# Patient Record
Sex: FEMALE | Race: WHITE | NOT HISPANIC OR LATINO | Employment: FULL TIME | URBAN - METROPOLITAN AREA
[De-identification: names, ages, dates, MRNs, and addresses within clinical notes are randomized per-mention and may not be internally consistent; named-entity substitution may affect disease eponyms.]

---

## 2021-02-27 ENCOUNTER — IMMUNIZATION (OUTPATIENT)
Dept: PHARMACY | Facility: CLINIC | Age: 31
End: 2021-02-27
Payer: COMMERCIAL

## 2021-02-27 DIAGNOSIS — Z23 NEED FOR VACCINATION: Primary | ICD-10-CM

## 2021-03-27 ENCOUNTER — IMMUNIZATION (OUTPATIENT)
Dept: PHARMACY | Facility: CLINIC | Age: 31
End: 2021-03-27
Payer: COMMERCIAL

## 2021-03-27 DIAGNOSIS — Z23 NEED FOR VACCINATION: Primary | ICD-10-CM

## 2021-07-20 ENCOUNTER — HOSPITAL ENCOUNTER (OUTPATIENT)
Dept: RADIOLOGY | Facility: HOSPITAL | Age: 31
Discharge: HOME | End: 2021-07-20
Attending: NURSE PRACTITIONER
Payer: COMMERCIAL

## 2021-07-20 ENCOUNTER — OFFICE VISIT (OUTPATIENT)
Dept: INTERNAL MEDICINE | Facility: CLINIC | Age: 31
End: 2021-07-20
Payer: COMMERCIAL

## 2021-07-20 VITALS
DIASTOLIC BLOOD PRESSURE: 78 MMHG | SYSTOLIC BLOOD PRESSURE: 110 MMHG | OXYGEN SATURATION: 98 % | BODY MASS INDEX: 35.01 KG/M2 | HEART RATE: 89 BPM | HEIGHT: 62 IN | WEIGHT: 190.25 LBS

## 2021-07-20 DIAGNOSIS — N91.2 AMENORRHEA: Primary | ICD-10-CM

## 2021-07-20 DIAGNOSIS — M54.9 DORSALGIA, UNSPECIFIED: ICD-10-CM

## 2021-07-20 DIAGNOSIS — M79.605 PAIN OF LEFT LOWER EXTREMITY: ICD-10-CM

## 2021-07-20 DIAGNOSIS — M54.30 SCIATICA, UNSPECIFIED LATERALITY: ICD-10-CM

## 2021-07-20 DIAGNOSIS — Z00.00 PREVENTATIVE HEALTH CARE: ICD-10-CM

## 2021-07-20 DIAGNOSIS — Z00.00 PREVENTATIVE HEALTH CARE: Primary | ICD-10-CM

## 2021-07-20 PROCEDURE — 3008F BODY MASS INDEX DOCD: CPT | Mod: CPTII,S$GLB,, | Performed by: NURSE PRACTITIONER

## 2021-07-20 PROCEDURE — 99385 PREV VISIT NEW AGE 18-39: CPT | Mod: 25,S$GLB,, | Performed by: NURSE PRACTITIONER

## 2021-07-20 PROCEDURE — 96372 PR INJECTION,THERAP/PROPH/DIAG2ST, IM OR SUBCUT: ICD-10-PCS | Mod: S$GLB,,, | Performed by: NURSE PRACTITIONER

## 2021-07-20 PROCEDURE — 99999 PR PBB SHADOW E&M-EST. PATIENT-LVL III: ICD-10-PCS | Mod: PBBFAC,,, | Performed by: NURSE PRACTITIONER

## 2021-07-20 PROCEDURE — 99999 PR PBB SHADOW E&M-EST. PATIENT-LVL III: CPT | Mod: PBBFAC,,, | Performed by: NURSE PRACTITIONER

## 2021-07-20 PROCEDURE — 3008F PR BODY MASS INDEX (BMI) DOCUMENTED: ICD-10-PCS | Mod: CPTII,S$GLB,, | Performed by: NURSE PRACTITIONER

## 2021-07-20 PROCEDURE — 99385 PR PREVENTIVE VISIT,NEW,18-39: ICD-10-PCS | Mod: 25,S$GLB,, | Performed by: NURSE PRACTITIONER

## 2021-07-20 PROCEDURE — 96372 THER/PROPH/DIAG INJ SC/IM: CPT | Mod: S$GLB,,, | Performed by: NURSE PRACTITIONER

## 2021-07-20 RX ORDER — IBUPROFEN 400 MG/1
800 TABLET ORAL EVERY 8 HOURS PRN
Qty: 60 TABLET | Refills: 1 | Status: SHIPPED | OUTPATIENT
Start: 2021-07-20 | End: 2021-10-11

## 2021-07-20 RX ORDER — KETOROLAC TROMETHAMINE 30 MG/ML
30 INJECTION, SOLUTION INTRAMUSCULAR; INTRAVENOUS
Status: COMPLETED | OUTPATIENT
Start: 2021-07-20 | End: 2021-07-20

## 2021-07-20 RX ADMIN — KETOROLAC TROMETHAMINE 30 MG: 30 INJECTION, SOLUTION INTRAMUSCULAR; INTRAVENOUS at 09:07

## 2021-07-30 ENCOUNTER — PATIENT MESSAGE (OUTPATIENT)
Dept: INTERNAL MEDICINE | Facility: CLINIC | Age: 31
End: 2021-07-30

## 2021-08-05 ENCOUNTER — HOSPITAL ENCOUNTER (OUTPATIENT)
Dept: RADIOLOGY | Facility: HOSPITAL | Age: 31
Discharge: HOME | End: 2021-08-05
Attending: NURSE PRACTITIONER
Payer: COMMERCIAL

## 2021-08-05 PROCEDURE — 72202 XR SACROILIAC JOINTS COMPLETE: ICD-10-PCS | Mod: 26,,, | Performed by: RADIOLOGY

## 2021-08-05 PROCEDURE — 72202 X-RAY EXAM SI JOINTS 3/> VWS: CPT | Mod: TC,PO

## 2021-08-05 PROCEDURE — 72110 XR LUMBAR SPINE COMPLETE 5 VIEW: ICD-10-PCS | Mod: 26,,, | Performed by: RADIOLOGY

## 2021-08-05 PROCEDURE — 72110 X-RAY EXAM L-2 SPINE 4/>VWS: CPT | Mod: TC,PO

## 2021-08-05 PROCEDURE — 72110 X-RAY EXAM L-2 SPINE 4/>VWS: CPT | Mod: 26,,, | Performed by: RADIOLOGY

## 2021-08-05 PROCEDURE — 72202 X-RAY EXAM SI JOINTS 3/> VWS: CPT | Mod: 26,,, | Performed by: RADIOLOGY

## 2021-08-08 ENCOUNTER — PATIENT MESSAGE (OUTPATIENT)
Dept: INTERNAL MEDICINE | Facility: CLINIC | Age: 31
End: 2021-08-08

## 2021-08-22 ENCOUNTER — PATIENT MESSAGE (OUTPATIENT)
Dept: INTERNAL MEDICINE | Facility: CLINIC | Age: 31
End: 2021-08-22

## 2021-08-22 DIAGNOSIS — M54.30 SCIATICA, UNSPECIFIED LATERALITY: ICD-10-CM

## 2021-08-22 DIAGNOSIS — M54.9 DORSALGIA, UNSPECIFIED: Primary | ICD-10-CM

## 2021-08-22 DIAGNOSIS — M79.605 PAIN OF LEFT LOWER EXTREMITY: ICD-10-CM

## 2021-08-23 ENCOUNTER — TELEPHONE (OUTPATIENT)
Dept: PAIN MEDICINE | Facility: CLINIC | Age: 31
End: 2021-08-23

## 2021-08-23 ENCOUNTER — PATIENT MESSAGE (OUTPATIENT)
Dept: INTERNAL MEDICINE | Facility: CLINIC | Age: 31
End: 2021-08-23

## 2021-08-26 ENCOUNTER — PATIENT MESSAGE (OUTPATIENT)
Dept: ORTHOPEDICS | Facility: CLINIC | Age: 31
End: 2021-08-26

## 2021-09-10 ENCOUNTER — TELEPHONE (OUTPATIENT)
Dept: PAIN MEDICINE | Facility: CLINIC | Age: 31
End: 2021-09-10

## 2021-09-22 ENCOUNTER — PATIENT MESSAGE (OUTPATIENT)
Dept: ORTHOPEDICS | Facility: CLINIC | Age: 31
End: 2021-09-22

## 2021-10-07 ENCOUNTER — PATIENT MESSAGE (OUTPATIENT)
Dept: ORTHOPEDICS | Facility: CLINIC | Age: 31
End: 2021-10-07

## 2021-10-11 ENCOUNTER — OFFICE VISIT (OUTPATIENT)
Dept: ORTHOPEDICS | Facility: CLINIC | Age: 31
End: 2021-10-11
Payer: COMMERCIAL

## 2021-10-11 ENCOUNTER — HOSPITAL ENCOUNTER (OUTPATIENT)
Dept: RADIOLOGY | Facility: HOSPITAL | Age: 31
Discharge: HOME | End: 2021-10-11
Attending: ORTHOPAEDIC SURGERY
Payer: COMMERCIAL

## 2021-10-11 VITALS — BODY MASS INDEX: 33.43 KG/M2 | HEIGHT: 62 IN | WEIGHT: 181.69 LBS

## 2021-10-11 DIAGNOSIS — M51.36 DDD (DEGENERATIVE DISC DISEASE), LUMBAR: ICD-10-CM

## 2021-10-11 DIAGNOSIS — M54.16 LUMBAR RADICULOPATHY: Primary | ICD-10-CM

## 2021-10-11 PROCEDURE — 3044F HG A1C LEVEL LT 7.0%: CPT | Mod: CPTII,S$GLB,, | Performed by: ORTHOPAEDIC SURGERY

## 2021-10-11 PROCEDURE — 1159F PR MEDICATION LIST DOCUMENTED IN MEDICAL RECORD: ICD-10-PCS | Mod: CPTII,S$GLB,, | Performed by: ORTHOPAEDIC SURGERY

## 2021-10-11 PROCEDURE — 99204 OFFICE O/P NEW MOD 45 MIN: CPT | Mod: S$GLB,,, | Performed by: ORTHOPAEDIC SURGERY

## 2021-10-11 PROCEDURE — 99999 PR PBB SHADOW E&M-EST. PATIENT-LVL III: ICD-10-PCS | Mod: PBBFAC,,, | Performed by: ORTHOPAEDIC SURGERY

## 2021-10-11 PROCEDURE — 3008F PR BODY MASS INDEX (BMI) DOCUMENTED: ICD-10-PCS | Mod: CPTII,S$GLB,, | Performed by: ORTHOPAEDIC SURGERY

## 2021-10-11 PROCEDURE — 3044F PR MOST RECENT HEMOGLOBIN A1C LEVEL <7.0%: ICD-10-PCS | Mod: CPTII,S$GLB,, | Performed by: ORTHOPAEDIC SURGERY

## 2021-10-11 PROCEDURE — 72110 X-RAY EXAM L-2 SPINE 4/>VWS: CPT | Mod: TC

## 2021-10-11 PROCEDURE — 99999 PR PBB SHADOW E&M-EST. PATIENT-LVL III: CPT | Mod: PBBFAC,,, | Performed by: ORTHOPAEDIC SURGERY

## 2021-10-11 PROCEDURE — 3008F BODY MASS INDEX DOCD: CPT | Mod: CPTII,S$GLB,, | Performed by: ORTHOPAEDIC SURGERY

## 2021-10-11 PROCEDURE — 1159F MED LIST DOCD IN RCRD: CPT | Mod: CPTII,S$GLB,, | Performed by: ORTHOPAEDIC SURGERY

## 2021-10-11 PROCEDURE — 72110 X-RAY EXAM L-2 SPINE 4/>VWS: CPT | Mod: 26,,, | Performed by: RADIOLOGY

## 2021-10-11 PROCEDURE — 72110 XR LUMBAR SPINE AP AND LAT WITH FLEX/EXT: ICD-10-PCS | Mod: 26,,, | Performed by: RADIOLOGY

## 2021-10-11 PROCEDURE — 99204 PR OFFICE/OUTPT VISIT, NEW, LEVL IV, 45-59 MIN: ICD-10-PCS | Mod: S$GLB,,, | Performed by: ORTHOPAEDIC SURGERY

## 2021-10-11 RX ORDER — MELOXICAM 15 MG/1
15 TABLET ORAL DAILY PRN
Qty: 30 TABLET | Refills: 0 | Status: SHIPPED | OUTPATIENT
Start: 2021-10-11 | End: 2021-12-06 | Stop reason: SDUPTHER

## 2021-10-11 RX ORDER — GABAPENTIN 300 MG/1
300 CAPSULE ORAL NIGHTLY
Qty: 30 CAPSULE | Refills: 11 | Status: SHIPPED | OUTPATIENT
Start: 2021-10-11 | End: 2022-10-11

## 2021-10-11 RX ORDER — METHYLPREDNISOLONE 4 MG/1
TABLET ORAL
Qty: 1 PACKAGE | Refills: 0 | Status: SHIPPED | OUTPATIENT
Start: 2021-10-11 | End: 2021-11-01

## 2021-10-20 ENCOUNTER — CLINICAL SUPPORT (OUTPATIENT)
Dept: REHABILITATION | Facility: HOSPITAL | Age: 31
End: 2021-10-20
Payer: COMMERCIAL

## 2021-10-20 DIAGNOSIS — R29.3 POSTURE ABNORMALITY: ICD-10-CM

## 2021-10-20 DIAGNOSIS — M62.81 WEAKNESS OF TRUNK MUSCULATURE: ICD-10-CM

## 2021-10-20 DIAGNOSIS — M53.86 DECREASED ROM OF LUMBAR SPINE: ICD-10-CM

## 2021-10-20 DIAGNOSIS — M54.16 LUMBAR RADICULOPATHY: ICD-10-CM

## 2021-10-20 PROCEDURE — 97110 THERAPEUTIC EXERCISES: CPT

## 2021-10-20 PROCEDURE — 97161 PT EVAL LOW COMPLEX 20 MIN: CPT

## 2021-10-25 ENCOUNTER — PATIENT MESSAGE (OUTPATIENT)
Dept: ORTHOPEDICS | Facility: CLINIC | Age: 31
End: 2021-10-25
Payer: COMMERCIAL

## 2021-10-25 DIAGNOSIS — M54.16 LUMBAR RADICULOPATHY: Primary | ICD-10-CM

## 2021-11-04 ENCOUNTER — CLINICAL SUPPORT (OUTPATIENT)
Dept: REHABILITATION | Facility: HOSPITAL | Age: 31
End: 2021-11-04
Payer: COMMERCIAL

## 2021-11-04 DIAGNOSIS — R29.3 POSTURE ABNORMALITY: ICD-10-CM

## 2021-11-04 DIAGNOSIS — M62.81 WEAKNESS OF TRUNK MUSCULATURE: ICD-10-CM

## 2021-11-04 DIAGNOSIS — M53.86 DECREASED ROM OF LUMBAR SPINE: ICD-10-CM

## 2021-11-04 PROCEDURE — 97110 THERAPEUTIC EXERCISES: CPT | Mod: CQ

## 2021-11-09 ENCOUNTER — CLINICAL SUPPORT (OUTPATIENT)
Dept: REHABILITATION | Facility: HOSPITAL | Age: 31
End: 2021-11-09
Payer: COMMERCIAL

## 2021-11-09 DIAGNOSIS — M62.81 WEAKNESS OF TRUNK MUSCULATURE: ICD-10-CM

## 2021-11-09 DIAGNOSIS — M53.86 DECREASED ROM OF LUMBAR SPINE: ICD-10-CM

## 2021-11-09 DIAGNOSIS — R29.3 POSTURE ABNORMALITY: ICD-10-CM

## 2021-11-09 PROCEDURE — 97110 THERAPEUTIC EXERCISES: CPT

## 2021-11-09 PROCEDURE — 97140 MANUAL THERAPY 1/> REGIONS: CPT

## 2021-11-11 ENCOUNTER — CLINICAL SUPPORT (OUTPATIENT)
Dept: REHABILITATION | Facility: HOSPITAL | Age: 31
End: 2021-11-11
Payer: COMMERCIAL

## 2021-11-11 DIAGNOSIS — M62.81 WEAKNESS OF TRUNK MUSCULATURE: ICD-10-CM

## 2021-11-11 DIAGNOSIS — M53.86 DECREASED ROM OF LUMBAR SPINE: ICD-10-CM

## 2021-11-11 DIAGNOSIS — R29.3 POSTURE ABNORMALITY: ICD-10-CM

## 2021-11-11 PROCEDURE — 97140 MANUAL THERAPY 1/> REGIONS: CPT

## 2021-11-16 ENCOUNTER — CLINICAL SUPPORT (OUTPATIENT)
Dept: REHABILITATION | Facility: HOSPITAL | Age: 31
End: 2021-11-16
Payer: COMMERCIAL

## 2021-11-16 DIAGNOSIS — M53.86 DECREASED ROM OF LUMBAR SPINE: ICD-10-CM

## 2021-11-16 DIAGNOSIS — M62.81 WEAKNESS OF TRUNK MUSCULATURE: ICD-10-CM

## 2021-11-16 DIAGNOSIS — R29.3 POSTURE ABNORMALITY: ICD-10-CM

## 2021-11-16 PROCEDURE — 97110 THERAPEUTIC EXERCISES: CPT

## 2021-11-16 PROCEDURE — 97140 MANUAL THERAPY 1/> REGIONS: CPT

## 2021-11-18 ENCOUNTER — CLINICAL SUPPORT (OUTPATIENT)
Dept: REHABILITATION | Facility: HOSPITAL | Age: 31
End: 2021-11-18
Payer: COMMERCIAL

## 2021-11-18 DIAGNOSIS — M62.81 WEAKNESS OF TRUNK MUSCULATURE: ICD-10-CM

## 2021-11-18 DIAGNOSIS — M53.86 DECREASED ROM OF LUMBAR SPINE: ICD-10-CM

## 2021-11-18 DIAGNOSIS — R29.3 POSTURE ABNORMALITY: ICD-10-CM

## 2021-11-18 PROCEDURE — 97140 MANUAL THERAPY 1/> REGIONS: CPT

## 2021-11-18 PROCEDURE — 97110 THERAPEUTIC EXERCISES: CPT

## 2021-11-22 ENCOUNTER — CLINICAL SUPPORT (OUTPATIENT)
Dept: REHABILITATION | Facility: HOSPITAL | Age: 31
End: 2021-11-22
Payer: COMMERCIAL

## 2021-11-22 DIAGNOSIS — R29.3 POSTURE ABNORMALITY: ICD-10-CM

## 2021-11-22 DIAGNOSIS — M62.81 WEAKNESS OF TRUNK MUSCULATURE: ICD-10-CM

## 2021-11-22 DIAGNOSIS — M53.86 DECREASED ROM OF LUMBAR SPINE: ICD-10-CM

## 2021-11-22 PROCEDURE — 97140 MANUAL THERAPY 1/> REGIONS: CPT

## 2021-11-24 ENCOUNTER — CLINICAL SUPPORT (OUTPATIENT)
Dept: REHABILITATION | Facility: HOSPITAL | Age: 31
End: 2021-11-24
Payer: COMMERCIAL

## 2021-11-24 DIAGNOSIS — M53.86 DECREASED ROM OF LUMBAR SPINE: ICD-10-CM

## 2021-11-24 DIAGNOSIS — R29.3 POSTURE ABNORMALITY: ICD-10-CM

## 2021-11-24 DIAGNOSIS — M62.81 WEAKNESS OF TRUNK MUSCULATURE: ICD-10-CM

## 2021-11-24 PROCEDURE — 97110 THERAPEUTIC EXERCISES: CPT

## 2021-11-30 ENCOUNTER — CLINICAL SUPPORT (OUTPATIENT)
Dept: REHABILITATION | Facility: HOSPITAL | Age: 31
End: 2021-11-30
Payer: COMMERCIAL

## 2021-11-30 DIAGNOSIS — R29.3 POSTURE ABNORMALITY: ICD-10-CM

## 2021-11-30 DIAGNOSIS — M62.81 WEAKNESS OF TRUNK MUSCULATURE: ICD-10-CM

## 2021-11-30 DIAGNOSIS — M53.86 DECREASED ROM OF LUMBAR SPINE: ICD-10-CM

## 2021-11-30 PROCEDURE — 97140 MANUAL THERAPY 1/> REGIONS: CPT

## 2021-12-01 ENCOUNTER — PATIENT MESSAGE (OUTPATIENT)
Dept: ORTHOPEDICS | Facility: CLINIC | Age: 31
End: 2021-12-01
Payer: COMMERCIAL

## 2021-12-01 DIAGNOSIS — M54.16 LUMBAR RADICULOPATHY: Primary | ICD-10-CM

## 2021-12-03 ENCOUNTER — CLINICAL SUPPORT (OUTPATIENT)
Dept: REHABILITATION | Facility: HOSPITAL | Age: 31
End: 2021-12-03
Payer: COMMERCIAL

## 2021-12-03 DIAGNOSIS — M53.86 DECREASED ROM OF LUMBAR SPINE: ICD-10-CM

## 2021-12-03 DIAGNOSIS — M62.81 WEAKNESS OF TRUNK MUSCULATURE: ICD-10-CM

## 2021-12-03 DIAGNOSIS — R29.3 POSTURE ABNORMALITY: ICD-10-CM

## 2021-12-03 PROCEDURE — 97140 MANUAL THERAPY 1/> REGIONS: CPT

## 2021-12-03 PROCEDURE — 97014 ELECTRIC STIMULATION THERAPY: CPT

## 2021-12-09 ENCOUNTER — CLINICAL SUPPORT (OUTPATIENT)
Dept: REHABILITATION | Facility: HOSPITAL | Age: 31
End: 2021-12-09
Payer: COMMERCIAL

## 2021-12-09 DIAGNOSIS — M62.81 WEAKNESS OF TRUNK MUSCULATURE: ICD-10-CM

## 2021-12-09 DIAGNOSIS — M53.86 DECREASED ROM OF LUMBAR SPINE: ICD-10-CM

## 2021-12-09 DIAGNOSIS — R29.3 POSTURE ABNORMALITY: ICD-10-CM

## 2021-12-09 PROCEDURE — 97014 ELECTRIC STIMULATION THERAPY: CPT

## 2021-12-10 ENCOUNTER — HOSPITAL ENCOUNTER (OUTPATIENT)
Dept: RADIOLOGY | Facility: HOSPITAL | Age: 31
Discharge: HOME | End: 2021-12-10
Attending: ORTHOPAEDIC SURGERY
Payer: COMMERCIAL

## 2021-12-10 DIAGNOSIS — M54.16 LUMBAR RADICULOPATHY: ICD-10-CM

## 2021-12-10 PROCEDURE — 72148 MRI LUMBAR SPINE W/O DYE: CPT | Mod: 26,,, | Performed by: RADIOLOGY

## 2021-12-10 PROCEDURE — 72148 MRI LUMBAR SPINE WITHOUT CONTRAST: ICD-10-PCS | Mod: 26,,, | Performed by: RADIOLOGY

## 2021-12-10 PROCEDURE — 72148 MRI LUMBAR SPINE W/O DYE: CPT | Mod: TC

## 2021-12-13 ENCOUNTER — OFFICE VISIT (OUTPATIENT)
Dept: ORTHOPEDICS | Facility: CLINIC | Age: 31
End: 2021-12-13
Payer: COMMERCIAL

## 2021-12-13 ENCOUNTER — PATIENT MESSAGE (OUTPATIENT)
Dept: ORTHOPEDICS | Facility: CLINIC | Age: 31
End: 2021-12-13

## 2021-12-13 DIAGNOSIS — M54.16 LUMBAR RADICULOPATHY: Primary | ICD-10-CM

## 2021-12-13 PROCEDURE — 99213 PR OFFICE/OUTPT VISIT, EST, LEVL III, 20-29 MIN: ICD-10-PCS | Mod: 95,,, | Performed by: ORTHOPAEDIC SURGERY

## 2021-12-13 PROCEDURE — 99213 OFFICE O/P EST LOW 20 MIN: CPT | Mod: 95,,, | Performed by: ORTHOPAEDIC SURGERY

## 2021-12-14 ENCOUNTER — PATIENT MESSAGE (OUTPATIENT)
Dept: PAIN MEDICINE | Facility: OTHER | Age: 31
End: 2021-12-14
Payer: COMMERCIAL

## 2021-12-15 ENCOUNTER — DOCUMENTATION ONLY (OUTPATIENT)
Dept: REHABILITATION | Facility: HOSPITAL | Age: 31
End: 2021-12-15
Payer: COMMERCIAL

## 2021-12-23 ENCOUNTER — HOSPITAL ENCOUNTER (OUTPATIENT)
Facility: OTHER | Age: 31
Discharge: HOME | End: 2021-12-23
Attending: ANESTHESIOLOGY | Admitting: ANESTHESIOLOGY
Payer: COMMERCIAL

## 2021-12-23 VITALS
HEART RATE: 99 BPM | OXYGEN SATURATION: 99 % | TEMPERATURE: 99 F | SYSTOLIC BLOOD PRESSURE: 124 MMHG | BODY MASS INDEX: 33.99 KG/M2 | DIASTOLIC BLOOD PRESSURE: 79 MMHG | WEIGHT: 180 LBS | RESPIRATION RATE: 16 BRPM | HEIGHT: 61 IN

## 2021-12-23 DIAGNOSIS — M54.16 LUMBAR RADICULOPATHY: Primary | ICD-10-CM

## 2021-12-23 DIAGNOSIS — G89.29 CHRONIC PAIN: ICD-10-CM

## 2021-12-23 PROCEDURE — 63600175 PHARM REV CODE 636 W HCPCS: Performed by: ANESTHESIOLOGY

## 2021-12-23 PROCEDURE — 25000003 PHARM REV CODE 250: Performed by: ANESTHESIOLOGY

## 2021-12-23 PROCEDURE — 64483 NJX AA&/STRD TFRM EPI L/S 1: CPT | Mod: 50 | Performed by: ANESTHESIOLOGY

## 2021-12-23 PROCEDURE — 25500020 PHARM REV CODE 255: Performed by: ANESTHESIOLOGY

## 2021-12-23 PROCEDURE — 64483 PR EPIDURAL INJ, ANES/STEROID, TRANSFORAMINAL, LUMB/SACR, SNGL LEVL: ICD-10-PCS | Mod: 50,,, | Performed by: ANESTHESIOLOGY

## 2021-12-23 PROCEDURE — 25000003 PHARM REV CODE 250: Performed by: STUDENT IN AN ORGANIZED HEALTH CARE EDUCATION/TRAINING PROGRAM

## 2021-12-23 PROCEDURE — 64483 NJX AA&/STRD TFRM EPI L/S 1: CPT | Mod: 50,,, | Performed by: ANESTHESIOLOGY

## 2021-12-23 RX ORDER — MIDAZOLAM HYDROCHLORIDE 1 MG/ML
INJECTION INTRAMUSCULAR; INTRAVENOUS
Status: DISCONTINUED | OUTPATIENT
Start: 2021-12-23 | End: 2021-12-23 | Stop reason: HOSPADM

## 2021-12-23 RX ORDER — FENTANYL CITRATE 50 UG/ML
INJECTION, SOLUTION INTRAMUSCULAR; INTRAVENOUS
Status: DISCONTINUED | OUTPATIENT
Start: 2021-12-23 | End: 2021-12-23 | Stop reason: HOSPADM

## 2021-12-23 RX ORDER — SODIUM CHLORIDE 9 MG/ML
INJECTION, SOLUTION INTRAVENOUS CONTINUOUS
Status: DISCONTINUED | OUTPATIENT
Start: 2021-12-23 | End: 2021-12-23 | Stop reason: HOSPADM

## 2021-12-23 RX ORDER — DEXAMETHASONE SODIUM PHOSPHATE 10 MG/ML
INJECTION INTRAMUSCULAR; INTRAVENOUS
Status: DISCONTINUED | OUTPATIENT
Start: 2021-12-23 | End: 2021-12-23 | Stop reason: HOSPADM

## 2021-12-23 RX ORDER — LIDOCAINE HYDROCHLORIDE 20 MG/ML
INJECTION, SOLUTION INFILTRATION; PERINEURAL
Status: DISCONTINUED | OUTPATIENT
Start: 2021-12-23 | End: 2021-12-23 | Stop reason: HOSPADM

## 2021-12-23 RX ORDER — LIDOCAINE HYDROCHLORIDE 10 MG/ML
INJECTION, SOLUTION EPIDURAL; INFILTRATION; INTRACAUDAL; PERINEURAL
Status: DISCONTINUED | OUTPATIENT
Start: 2021-12-23 | End: 2021-12-23 | Stop reason: HOSPADM

## 2021-12-23 RX ADMIN — SODIUM CHLORIDE: 0.9 INJECTION, SOLUTION INTRAVENOUS at 08:12

## 2022-01-06 ENCOUNTER — PATIENT MESSAGE (OUTPATIENT)
Dept: ORTHOPEDICS | Facility: CLINIC | Age: 32
End: 2022-01-06
Payer: COMMERCIAL

## 2022-01-07 ENCOUNTER — TELEPHONE (OUTPATIENT)
Dept: PAIN MEDICINE | Facility: OTHER | Age: 32
End: 2022-01-07
Payer: COMMERCIAL

## 2022-01-07 ENCOUNTER — PATIENT MESSAGE (OUTPATIENT)
Dept: PAIN MEDICINE | Facility: OTHER | Age: 32
End: 2022-01-07
Payer: COMMERCIAL

## 2022-01-07 DIAGNOSIS — M54.16 LUMBAR RADICULOPATHY: Primary | ICD-10-CM

## 2022-01-07 NOTE — PROGRESS NOTES
Spoke with pt virtually. She had a bilateral L4-L5 TFESI on 12/23/21 with 75% relief for 1 week. However, her pain has come back. Since our last visit on 12/13/21 she has been doing a home exercise program with worsening of pain. I provided the patient with a home exercise program. It is the [facility] spine conditioning program. Exercises include head rolls, kneeling back extension, sitting rotation stretch, modified seated side straddle, knee to chest, bird dog, plank, modified seated plank, hip bridges, abdominal bracing, and abdominal crunch. Pt completes each exercise 5 times daily. We have also tried gabapentin 300mg TID with no relief. Will repeat bilateral L4-L5 TFESI with pain management. If no relief, will consider surgery.

## 2022-01-11 ENCOUNTER — PATIENT MESSAGE (OUTPATIENT)
Dept: ORTHOPEDICS | Facility: CLINIC | Age: 32
End: 2022-01-11
Payer: COMMERCIAL

## 2022-01-12 DIAGNOSIS — M54.16 LUMBAR RADICULOPATHY: Primary | ICD-10-CM

## 2022-01-17 ENCOUNTER — PATIENT MESSAGE (OUTPATIENT)
Dept: PAIN MEDICINE | Facility: OTHER | Age: 32
End: 2022-01-17
Payer: COMMERCIAL

## 2022-01-18 ENCOUNTER — HOSPITAL ENCOUNTER (OUTPATIENT)
Facility: OTHER | Age: 32
Discharge: HOME | End: 2022-01-18
Attending: ANESTHESIOLOGY | Admitting: ANESTHESIOLOGY
Payer: COMMERCIAL

## 2022-01-18 VITALS
HEIGHT: 61 IN | WEIGHT: 180 LBS | OXYGEN SATURATION: 99 % | HEART RATE: 89 BPM | BODY MASS INDEX: 33.99 KG/M2 | TEMPERATURE: 98 F | RESPIRATION RATE: 14 BRPM | SYSTOLIC BLOOD PRESSURE: 157 MMHG | DIASTOLIC BLOOD PRESSURE: 97 MMHG

## 2022-01-18 DIAGNOSIS — M51.36 DDD (DEGENERATIVE DISC DISEASE), LUMBAR: Primary | ICD-10-CM

## 2022-01-18 DIAGNOSIS — G89.29 CHRONIC PAIN: ICD-10-CM

## 2022-01-18 DIAGNOSIS — M54.16 LUMBAR RADICULOPATHY: ICD-10-CM

## 2022-01-18 PROCEDURE — 25000003 PHARM REV CODE 250: Performed by: ANESTHESIOLOGY

## 2022-01-18 PROCEDURE — 64483 PR EPIDURAL INJ, ANES/STEROID, TRANSFORAMINAL, LUMB/SACR, SNGL LEVL: ICD-10-PCS | Mod: 50,,, | Performed by: ANESTHESIOLOGY

## 2022-01-18 PROCEDURE — 25000003 PHARM REV CODE 250: Performed by: STUDENT IN AN ORGANIZED HEALTH CARE EDUCATION/TRAINING PROGRAM

## 2022-01-18 PROCEDURE — 63600175 PHARM REV CODE 636 W HCPCS: Performed by: ANESTHESIOLOGY

## 2022-01-18 PROCEDURE — 64483 NJX AA&/STRD TFRM EPI L/S 1: CPT | Mod: 50,,, | Performed by: ANESTHESIOLOGY

## 2022-01-18 PROCEDURE — 64483 NJX AA&/STRD TFRM EPI L/S 1: CPT | Mod: 50 | Performed by: ANESTHESIOLOGY

## 2022-01-18 PROCEDURE — 25500020 PHARM REV CODE 255: Performed by: ANESTHESIOLOGY

## 2022-01-18 RX ORDER — MIDAZOLAM HYDROCHLORIDE 1 MG/ML
INJECTION INTRAMUSCULAR; INTRAVENOUS
Status: DISCONTINUED | OUTPATIENT
Start: 2022-01-18 | End: 2022-01-18 | Stop reason: HOSPADM

## 2022-01-18 RX ORDER — LIDOCAINE HYDROCHLORIDE 20 MG/ML
INJECTION, SOLUTION INFILTRATION; PERINEURAL
Status: DISCONTINUED | OUTPATIENT
Start: 2022-01-18 | End: 2022-01-18 | Stop reason: HOSPADM

## 2022-01-18 RX ORDER — SODIUM CHLORIDE 9 MG/ML
INJECTION, SOLUTION INTRAVENOUS CONTINUOUS
Status: DISCONTINUED | OUTPATIENT
Start: 2022-01-18 | End: 2022-01-18 | Stop reason: HOSPADM

## 2022-01-18 RX ORDER — ONDANSETRON 2 MG/ML
4 INJECTION INTRAMUSCULAR; INTRAVENOUS ONCE
Status: COMPLETED | OUTPATIENT
Start: 2022-01-18 | End: 2022-01-18

## 2022-01-18 RX ORDER — LIDOCAINE HYDROCHLORIDE 10 MG/ML
INJECTION, SOLUTION EPIDURAL; INFILTRATION; INTRACAUDAL; PERINEURAL
Status: DISCONTINUED | OUTPATIENT
Start: 2022-01-18 | End: 2022-01-18 | Stop reason: HOSPADM

## 2022-01-18 RX ORDER — DEXAMETHASONE SODIUM PHOSPHATE 10 MG/ML
INJECTION INTRAMUSCULAR; INTRAVENOUS
Status: DISCONTINUED | OUTPATIENT
Start: 2022-01-18 | End: 2022-01-18 | Stop reason: HOSPADM

## 2022-01-18 RX ORDER — FENTANYL CITRATE 50 UG/ML
INJECTION, SOLUTION INTRAMUSCULAR; INTRAVENOUS
Status: DISCONTINUED | OUTPATIENT
Start: 2022-01-18 | End: 2022-01-18 | Stop reason: HOSPADM

## 2022-01-18 RX ADMIN — ONDANSETRON 4 MG: 2 INJECTION INTRAMUSCULAR; INTRAVENOUS at 09:01

## 2022-01-18 RX ADMIN — SODIUM CHLORIDE: 0.9 INJECTION, SOLUTION INTRAVENOUS at 08:01

## 2022-01-18 NOTE — OP NOTE
Lumbar Transforaminal Epidural Steroid Injection under Fluoroscopic Guidance    The procedure, risks, benefits, and options were discussed with the patient. There are no contraindications to the procedure. The patent expressed understanding and agreed to the procedure. Informed written consent was obtained prior to the start of the procedure and can be found in the patient's chart.    PATIENT NAME: Marivel Chang   MRN: [id number]     DATE OF PROCEDURE: 01/18/2022    PROCEDURE:  Bilateral  L4/5 Lumbar Transforaminal Epidural Steroid Injection under Fluoroscopic Guidance    PRE-OP DIAGNOSIS: Lumbar radiculopathy [M54.16]    POST-OP DIAGNOSIS: Same    PHYSICIAN: Jeniffer Vásquez MD    ASSISTANTS:  Vi Hawley DO    MEDICATIONS INJECTED: Preservative-free Decadron 10mg with 5cc of Lidocaine 1% MPF     LOCAL ANESTHETIC INJECTED: Xylocaine 2%     SEDATION:   Versed 1mg and Fentanyl 50mcg                                                                                                                                                                                     Conscious sedation ordered by M.D. Patient re-evaluation prior to administration of conscious sedation. No changes noted in patient's status from initial evaluation. The patient's vital signs were monitored by RN and patient remained hemodynamically stable throughout the procedure.    Event Time In   In Facility 0818   In Pre-Procedure 0826   Physician Available    Anesthesia Available    Pre-Procedure Complete 0845   Out of Pre-Procedure    Anesthesia Start    Anesthesia Start Data Collection    In Room 0849   Prep Start    Procedure Prep Complete    Procedure Start 0856   Procedure Closing    Emergence    Procedure Finish 0910   Out of Room 0910   Cleanup Start    Cleanup Complete    In Recovery 0912   Anesthesia Finish    Recovery Care Complete    Out of Recovery    In Phase II    Out of Phase II    Phase II Care Complete    Procedural Care Complete     Sedation Start 0854   Sedation End 0910       ESTIMATED BLOOD LOSS: None    COMPLICATIONS: None    TECHNIQUE: Time-out was performed to identify the patient and procedure to be performed. With the patient laying in a prone position, the surgical area was prepped and draped in the usual sterile fashion using ChloraPrep and a fenestrated drape.The levels were determined under fluoroscopy guidance. Skin anesthesia was achieved by injecting Lidocaine 2% over the injection sites. The transforaminal spaces were then approached with a 22 gauge, 5 inch spinal quinke needle that was introduced under fluoroscopic guidance in the AP and Lateral views. Once the needle tip was in the area of the transforaminal space, and there was no blood, CSF or paraesthesias, contrast dye Omnipaque (300mg/ml) was injected to confirm placement and there was no vascular runoff. Fluoroscopic imaging in the AP and lateral views revealed a clear outline of the spinal nerve with proximal spread of agent through the neural foramen into the epidural space. 3 mL of the medication mixture listed above was injected slowly at each site. Displacement of the radio opaque contrast after injection of the medication confirmed that the medication went into the area of the transforaminal spaces. The needles were removed and bleeding was nil. A sterile dressing was applied. No specimens collected. The patient tolerated the procedure well.       The patient was monitored after the procedure in the recovery area. They were given post-procedure and discharge instructions to follow at home. The patient was discharged in a stable condition.    Vi Hawley DO

## 2022-01-18 NOTE — H&P
"HPI: Patient presents for scheduled bilateral L4/5 TFESI.      History reviewed. No pertinent past medical history.  Past Surgical History:   Procedure Laterality Date    TRANSFORAMINAL EPIDURAL INJECTION OF STEROID Bilateral 12/23/2021    Procedure: INJECTION, STEROID, EPIDURAL, TRANSFORAMINAL APPROACH, L4-L5 DIRECT REF;  Surgeon: Fabricio Sauceda MD;  Location: [facility];  Service: Pain Management;  Laterality: Bilateral;     Review of patient's allergies indicates:   Allergen Reactions    Penicillins         PMHx, PSHx, Allergies, Medications reviewed in epic      ROS negative except pain complaints in HPI    OBJECTIVE:    BP (!) 139/93   Pulse 95   Temp 98.4 °F (36.9 °C) (Oral)   Resp 18   Ht 5' 1" (1.549 m)   Wt 81.6 kg (180 lb)   SpO2 99%   Breastfeeding No   BMI 34.01 kg/m²     PHYSICAL EXAMINATION:    GENERAL: Well appearing, in no acute distress, alert and oriented x3.  PSYCH:  Mood and affect appropriate.  SKIN: Skin color, texture, turgor normal, no rashes or lesions.  CV: RRR with palpation of the radial artery.  PULM: No evidence of respiratory difficulty, symmetric chest rise. Clear to auscultation.  NEURO: Cranial nerves grossly intact.    Plan:    Proceed with procedure as planned    Vi Hawley  01/18/2022  "

## 2022-01-18 NOTE — DISCHARGE INSTRUCTIONS
Thank you for allowing us to care for you today. You may receive a survey about the care we provided. Your feedback is valuable and helps us provide excellent care throughout the community.     Home Care Instructions for Pain Management:    1. DIET:   You may resume your normal diet today.   2. BATHING:   You may shower with luke warm water. No tub baths or anything that will soak injection sites under water for the next 24 hours.  3. DRESSING:   You may remove your bandage today.   4. ACTIVITY LEVEL:   You may resume your normal activities 24 hrs after your procedure. Nothing strenuous today.  5. MEDICATIONS:   You may resume your normal medications today. To restart blood thinners, ask your doctor.  6. DRIVING    If you have received any sedatives by mouth today, you may not drive for 12 hours.    If you have received any sedation through your IV, you may not drive for 24 hrs.   7. SPECIAL INSTRUCTIONS:   No heat to the injection site for 24 hrs including, hot bath or shower, heating pad, moist heat, or hot tubs.    Use ice pack to injection site for any pain or discomfort.  Apply ice packs for 20 minute intervals as needed.    IF you have diabetes, be sure to monitor your blood sugar more closely. IF your injection contained steroids your blood sugar levels may become higher than normal.    If you are still having pain upon discharge:  Your pain may improve over the next 48 hours. The anesthetic (numbing medication) works immediately to 48 hours. IF your injection contained a steroid (anti-inflammatory medication), it takes approximately 3 days to start feeling relief and 7-10 days to see your greatest results from the medication. It is possible you may need subsequent injections. This would be discussed at your follow up appointment with pain management or your referring doctor.    Please call the PAIN MANAGEMENT office at [phone] or ON CALL pager at [phone] if you experienced any:   -Weakness or  loss of sensation  -Fever > 101.5  -Pain uncontrolled with oral medications   -Persistent nausea, vomiting, or diarrhea  -Redness or drainage from the injection sites, or any other worrisome concerns.   If physician on call was not reached or could not communicate with our office for any reason please go to the nearest emergency department.  Patient Education       Moderate and Deep Sedation in Adults   About this topic   Sedation changes a persons level of consciousness or being awake. Doctors give moderate or deep sedation to help you become more comfortable when they need to do a procedure. The staff watch you closely when you are given sedation. With sedation, you may not remember the procedure when it is over. The level of your sedation may be moderate or deep.  With moderate or conscious sedation, you:  · Will be relaxed and calm.  · May seem to sleep.  · May feel only slight pain or no pain at all.  · May talk and answer questions.  · Breathe on your own.  With deep sedation, you:  · Will be relaxed and calm.  · May seem to sleep.  · May feel only slight pain or no pain at all.  · Are not able talk or answer questions.  · May need help keeping your airway open or breathing.  Sedation is given by someone with special training. This is someone like a:  · Certified registered nurse anesthetist (CRNA)  · Anesthesiologist  · Doctor  · Dentist  · Oral surgeon  Why is this procedure done?   Sedation is most often given for procedures such as:  · Minor surgeries or procedures, like taking a tissue sample or fixing a broken bone  · Colonoscopy  · Vasectomy  · Dental surgery, like an implant or taking out an impacted tooth  · Endoscopy  · Bronchoscopy  · Plastic cosmetic surgery  · Endotracheal procedure  What happens before the procedure?   · Your doctor will take your history and do an exam. Tell the doctor if you have any drug allergy. Talk to the doctor about:  ? All the drugs you are taking. Be sure to include  all prescription, over-the-counter, vitamins, and herbal supplements. Bring a list of drugs you take with you.  ? Any bleeding problems. Be sure to tell your doctor if you are taking any drugs that may cause bleeding. Some of these are warfarin, rivaroxaban, apixaban, ticagrelor, clopidogrel, ibuprofen, naproxen, or aspirin. Certain vitamins and herbs, such as garlic and fish oil, may also add to the risk for bleeding. You may need to stop these drugs as well. Talk to your doctor about them.  ? Any previous problems with sedation or anesthesia.  · Talk with the doctor about when you last ate or drank anything.  · You will not be allowed to drive after the procedure. Plan another way to get home.  What happens after the procedure?   You may feel these side effects:  · Headache  · Upset stomach or throwing up  · Hangover feeling  · Short period of no memory or cloudy memory  · Bad memories  · Confusion or hallucinations  What care is needed at home?   · Do not make major decisions or sign important papers for at least 24 hours. You may not be thinking clearly.  · Do not drive or operate machinery for 24 hours or until OK'd by your doctor.  What problems could happen?   · Low blood pressure  · Breathing problems  · Heart problems  · Allergic reactions  Last Reviewed Date   2021-05-06  Consumer Information Use and Disclaimer   This information is not specific medical advice and does not replace information you receive from your health care provider. This is only a brief summary of general information. It does NOT include all information about conditions, illnesses, injuries, tests, procedures, treatments, therapies, discharge instructions or life-style choices that may apply to you. You must talk with your health care provider for complete information about your health and treatment options. This information should not be used to decide whether or not to accept your health care providers advice, instructions or  recommendations. Only your health care provider has the knowledge and training to provide advice that is right for you.  Copyright   Copyright © 2021 [employer] and its affiliates and/or licensors. All rights reserved.

## 2022-01-18 NOTE — DISCHARGE SUMMARY
[facility] - Pain Management ([place])  Discharge Note  Short Stay    Procedure(s) (LRB):  Injection,steroid,epidural,transforaminal approach BILATERAL L4/5 DIRECT REFERRAL (Bilateral)    OUTCOME: Patient tolerated treatment/procedure well without complication and is now ready for discharge.    DISPOSITION: Home or Self Care    FINAL DIAGNOSIS:  <principal problem not specified>    FOLLOWUP: In clinic    DISCHARGE INSTRUCTIONS:  No discharge procedures on file.      Clinical Reference Documents Added to Patient Instructions       Document    MODERATE AND DEEP SEDATION IN ADULTS (ENGLISH)          TIME SPENT ON DISCHARGE: 5 minutes

## 2022-02-07 ENCOUNTER — CLINICAL SUPPORT (OUTPATIENT)
Dept: REHABILITATION | Facility: HOSPITAL | Age: 32
End: 2022-02-07
Payer: COMMERCIAL

## 2022-02-07 DIAGNOSIS — M54.16 LUMBAR RADICULOPATHY: ICD-10-CM

## 2022-02-07 PROCEDURE — 97161 PT EVAL LOW COMPLEX 20 MIN: CPT

## 2022-02-07 PROCEDURE — 97110 THERAPEUTIC EXERCISES: CPT

## 2022-02-07 NOTE — PLAN OF CARE
"[facility]   Physical Therapy Initial Evaluation     Date: 2/7/2022   Name: Marivel Chang  Clinic Number: [id number]    Therapy Diagnosis:   Encounter Diagnosis   Name Primary?    Lumbar radiculopathy      Physician: Jaquelin Betts,*    Physician Orders: PT Eval and Treat   Medical Diagnosis from Referral: M54.16 (ICD-10-CM) - Lumbar radiculopathy  Evaluation Date: 2/7/2022  Authorization Period Expiration: 12/31/2022  Plan of Care Expiration: 5/7/2022  Progress Note Due: 3/7/2022  Visit # / Visits authorized: 1/ 52   FOTO: 0/5    Precautions: Standard     Time In: 450  Time Out: 525  Total Appointment Time (timed & untimed codes): 35 minutes      SUBJECTIVE     Date of onset: May of 2021    History of current condition - Marivel reports: she has been having back pain since May that progressively got worse. She previously went to PT then got an MRI indicating bulging discs. She then stopped PT until she could get ESIs of which she just got the second one 3 weeks ago. She reports that the pain is an electric shooting down her L leg from her hip to the inside of her L ankle/foot. On the right side it wraps from her low back to her right him in a pulling sensation "like a rubber band". Her pain got ~20-30% better since the last MARGARETTE and is ready to try PT again. She states that her next step if this doesn't work is surgery, which she would like to avoid. She is significantly more sedentary now than she was before the back pain. She can stand up straighter now with less pain after the injections.     Falls: none    Imaging, MRI studies: "Impression: Multilevel circumferential disc bulges at L3-S1, most significant at L4-L5 with superimposed disc protrusion resulting in severe spinal canal stenosis."    Prior Therapy: none prior   Social History:  lives with their spouse  Occupation: teacher   Prior Level of Function: (I)  Current Level of Function: (I), but difficulty with walking " "long distances, standing for long periods of time, and relaxing 2/2 pain    Pain:  Current 0/10, worst 7/10, best 0/10   Location: bilateral back  left leg(s)  Description: electrical shock on L, R is "pulling like a rubber band"  Aggravating Factors: Standing, Walking and Extension  Easing Factors: pain medication, heating pad, rest and sitting and deep squat    Patients goals: to get back to walking a normal amount (10,000-15,000)     Medical History:   No past medical history on file.    Surgical History:   Marivel Chang  has a past surgical history that includes Transforaminal epidural injection of steroid (Bilateral, 2021) and Transforaminal epidural injection of steroid (Bilateral, 2022).    Medications:   Marivel has a current medication list which includes the following prescription(s): gabapentin and meloxicam.    Allergies:   Review of patient's allergies indicates:   Allergen Reactions    Penicillins           OBJECTIVE     Lumbar AROM: Pain/Dysfunction with Movement:   Flexion: 53 degrees "bothersome"   Extension: 10 degrees painful   Right side bendin degrees    Left side bendin degrees    Right rotation: ~30% limited    Left rotation: ~30% limited       Right Left Comment   Hip Flexion: 4/5 4-/5    Hip ABD: 4-/5 4-/5    Hip ADD: 3+/5 3+/5    Hip Extension: NT NT Performs fairly good bridge against gravity   Hip IR: 4-/5 3+/5    Hip ER: 4-/5 4-/5       Right Left Comment   DF: 4+/5 4+/5    PF: 4/5 4/5    Knee flexion: 4/5 4/5    Knee extension: 4/5 4/5         Posture: significant anterior pelvic tilt in standing, R shoulder elevated    Repeated Motions Positive/Negative   Flexion positive   Extension    Right Side Bend    Left Side Bend      Neural Tension Positive/Negative   Passive SLR w/ DF  Positive B     Slump test: positive B    ANIKA: positive B    FADIR: negative B     Thigh Thrust: Positive R    Palpation: TTP at R SIJ    Joint mobility: dec'd t/o L-spine - " assessment performed in sidelying    Hip flexion: WNL - reproduces symptoms on L at end-range    Hip IR: ~50% limited on L in prone and supine, Reproduces L radicular symptoms      Limitation/Restriction for FOTO  Survey    Therapist reviewed FOTO scores for Marivel Chang on 2/7/2022.   FOTO documents entered into [employer] - see Media section.    Limitation Score: 60%         TREATMENT     Total Treatment time (time-based codes) separate from Evaluation: 8 minutes      Marivel received the treatments listed below:      therapeutic exercises to develop strength, endurance, ROM and core stabilization for 8 minutes including:  Reviewed HEP including the following:  TrA activation  LTRs    PATIENT EDUCATION AND HOME EXERCISES     Education provided:   - Role of PT  - PT POC  - HEP  - dry needling    Written Home Exercises Provided: yes. Exercises were reviewed and Marivel was able to demonstrate them prior to the end of the session.  Marivel demonstrated good  understanding of the education provided. See EMR under Patient Instructions for exercises provided during therapy sessions.    ASSESSMENT     Marivel is a 31 y.o. female referred to outpatient Physical Therapy with a medical diagnosis of lumbar radiculopathy. Patient presents with impairments in strength, ROM, motor control, joint mobility, pain, tissue extensibility, activity tolerance, gait, endurance, and posture. Trunk extension worsens pain and flexion improves pain. Significant asymmetry in hip ROM as noted above with reproduction of L sided symptoms during hip IR and end-range flexion. Inc'd R symptoms during thigh-thrust. Discussed incorporation of FDN into treatment plan, and pt agreeable.    Patient prognosis is Fair.   Patientt will benefit from skilled outpatient Physical Therapy to address the deficits stated above and in the chart below, provide patient /family education, and to maximize patientt's level of independence.     Plan of care discussed  with patient: Yes  Patient's spiritual, cultural and educational needs considered and patient is agreeable to the plan of care and goals as stated below:     Anticipated Barriers for therapy: chronicity, pain    Medical Necessity is demonstrated by the following  History  Co-morbidities and personal factors that may impact the plan of care Co-morbidities:   see above    Personal Factors:   no deficits     low   Examination  Body Structures and Functions, activity limitations and participation restrictions that may impact the plan of care Body Regions:   back  lower extremities  trunk    Body Systems:    gross symmetry  ROM  strength  gross coordinated movement  balance  gait  transfers  transitions  motor control    Participation Restrictions:   Walking, playing with her son    Activity limitations:   Learning and applying knowledge  no deficits    General Tasks and Commands  no deficits    Communication  no deficits    Mobility  walking    Self care  no deficits    Domestic Life  no deficits    Interactions/Relationships  no deficits    Life Areas  no deficits    Community and Social Life  recreation and leisure         moderate   Clinical Presentation stable and uncomplicated low   Decision Making/ Complexity Score: low     Goals:  Short Term Goals (6 Weeks):  1. Pt will be compliant with HEP to supplement PT in decreasing pain with functional mobility.  2. Pt will perform pallof press with good control to demonstrate improved core strength.  3. Pt will improve impaired lumbar ROM by 10 degrees to improve functional mobility.  4. Pt will improve impaired LE MMTs by 1/2 score to improve strength for functional tasks.  Long Term Goals (12 Weeks):   1. Pt will improve FOTO score to </= 37% limited to decrease perceived limitation with maintaining/changing body position.   2. Pt will perform floor to waist lift with good control to demonstrate improved core strength.  3. Pt will improve impaired LE MMTs by 1 score to  improve strength for functional tasks.  4. Pt will report no pain during lumbar ROM to promote functional mobility.   5. Pt will be able to ambulate x6 minutes with pain rating of no more than 3/10 for improved functional mobility and QOL.       PLAN   Plan of care Certification: 2/7/2022 to 5/7/2022.    Outpatient Physical Therapy 2 times weekly for 10 weeks to include the following interventions: Cervical/Lumbar Traction, Electrical Stimulation  , Gait Training, Manual Therapy, Moist Heat/ Ice, Neuromuscular Re-ed, Patient Education, Self Care, Therapeutic Activities, Therapeutic Exercise, Ultrasound and kinesiology, IASTM, and dry needling.     Jesus Sparks, PT      I CERTIFY THE NEED FOR THESE SERVICES FURNISHED UNDER THIS PLAN OF TREATMENT AND WHILE UNDER MY CARE   Physician's comments:     Physician's Signature: ___________________________________________________

## 2022-02-14 ENCOUNTER — CLINICAL SUPPORT (OUTPATIENT)
Dept: REHABILITATION | Facility: HOSPITAL | Age: 32
End: 2022-02-14
Payer: COMMERCIAL

## 2022-02-14 DIAGNOSIS — R29.3 POSTURE ABNORMALITY: ICD-10-CM

## 2022-02-14 DIAGNOSIS — M62.81 WEAKNESS OF TRUNK MUSCULATURE: ICD-10-CM

## 2022-02-14 DIAGNOSIS — M53.86 DECREASED ROM OF LUMBAR SPINE: ICD-10-CM

## 2022-02-14 PROCEDURE — 97110 THERAPEUTIC EXERCISES: CPT

## 2022-02-14 NOTE — PROGRESS NOTES
[facility]   Physical Therapy Treatment Note     Name: Marivel Chang  Clinic Number: [id number]    Therapy Diagnosis:   Encounter Diagnoses   Name Primary?    Weakness of trunk musculature     Decreased ROM of lumbar spine     Posture abnormality      Physician: Jaquelin Betts,*    Visit Date: 2/14/2022      Physician Orders: PT Eval and Treat   Medical Diagnosis from Referral: M54.16 (ICD-10-CM) - Lumbar radiculopathy  Evaluation Date: 2/7/2022  Authorization Period Expiration: 12/31/2022  Plan of Care Expiration: 5/7/2022  Progress Note Due: 3/7/2022  Visit # / Visits authorized: 1/ 52   FOTO: 0/5    Precautions: Standard       PTA Visit #: 0/5     Time In: 430  Time Out: 515  Total Billable Time: 45 minutes    SUBJECTIVE     Pt reports: she has been doing her two home exercises with no adverse effects. Patient reports she feels ok today with minimal pain down the L leg.  She was compliant with home exercise program.  Response to previous treatment: No adverse effects   Functional change: None    Pain: 4/10  Location: left lower legs     OBJECTIVE     Objective Measures updated at progress report unless specified.     Treatment     Marivel received the treatments listed below:      therapeutic exercises to develop strength, endurance, ROM, flexibility, posture and core stabilization for 45 minutes including:      Bike 6 min  Ball Rolls fwd/diag 10x  Piriformis stretch 30 sec x 3  Calf stretching 30 sec x 3  DKTC w/ Swiss ball 2x10  Nerve Gliding 2x10   QL Door Stretch 10sec x 10  TrA activation  LTRs            Patient Education and Home Exercises     Home Exercises Provided and Patient Education Provided     Education provided:   - Cont HEP    Written Home Exercises Provided: yes. Exercises were reviewed and Marivel was able to demonstrate them prior to the end of the session.  Marivel demonstrated good  understanding of the education provided. See EMR under Patient  Instructions for exercises provided during therapy sessions    ASSESSMENT     Patient presents to PT for first after appointment from initial eval. Patient presents with slight symptoms in L leg and ankle. Therex today was to emphasize flexion specific therex to increased intervertebral space in lumbar spine. Patient had no adverse effects from todays session. Patient was given additional HEP today.     Marivel Is progressing well towards her goals.   Pt prognosis is Fair.     Pt will continue to benefit from skilled outpatient physical therapy to address the deficits listed in the problem list box on initial evaluation, provide pt/family education and to maximize pt's level of independence in the home and community environment.     Pt's spiritual, cultural and educational needs considered and pt agreeable to plan of care and goals.     Anticipated barriers to physical therapy: chronicity, pain       Goals:   Short Term Goals (6 Weeks):  1. Pt will be compliant with HEP to supplement PT in decreasing pain with functional mobility.  2. Pt will perform pallof press with good control to demonstrate improved core strength.  3. Pt will improve impaired lumbar ROM by 10 degrees to improve functional mobility.  4. Pt will improve impaired LE MMTs by 1/2 score to improve strength for functional tasks.  Long Term Goals (12 Weeks):   1. Pt will improve FOTO score to </= 37% limited to decrease perceived limitation with maintaining/changing body position.   2. Pt will perform floor to waist lift with good control to demonstrate improved core strength.  3. Pt will improve impaired LE MMTs by 1 score to improve strength for functional tasks.  4. Pt will report no pain during lumbar ROM to promote functional mobility.   5. Pt will be able to ambulate x6 minutes with pain rating of no more than 3/10 for improved functional mobility and QOL.         PLAN   Plan of care Certification: 2/7/2022 to 5/7/2022.     Outpatient Physical  Therapy 2 times weekly for 10 weeks to include the following interventions: Cervical/Lumbar Traction, Electrical Stimulation  , Gait Training, Manual Therapy, Moist Heat/ Ice, Neuromuscular Re-ed, Patient Education, Self Care, Therapeutic Activities, Therapeutic Exercise, Ultrasound and kinesiology, IASTM, and dry needling.        Nilson Pal, PT

## 2022-02-17 ENCOUNTER — CLINICAL SUPPORT (OUTPATIENT)
Dept: REHABILITATION | Facility: HOSPITAL | Age: 32
End: 2022-02-17
Payer: COMMERCIAL

## 2022-02-17 DIAGNOSIS — R29.3 POSTURE ABNORMALITY: ICD-10-CM

## 2022-02-17 DIAGNOSIS — M62.81 WEAKNESS OF TRUNK MUSCULATURE: ICD-10-CM

## 2022-02-17 DIAGNOSIS — M53.86 DECREASED ROM OF LUMBAR SPINE: ICD-10-CM

## 2022-02-17 PROCEDURE — 97110 THERAPEUTIC EXERCISES: CPT

## 2022-02-17 PROCEDURE — 97140 MANUAL THERAPY 1/> REGIONS: CPT

## 2022-02-17 NOTE — PROGRESS NOTES
[facility]   Physical Therapy Treatment Note     Name: Marivel Chang  Clinic Number: [id number]    Therapy Diagnosis:   Encounter Diagnoses   Name Primary?    Weakness of trunk musculature     Decreased ROM of lumbar spine     Posture abnormality      Physician: Jaquelin Betts,*    Visit Date: 2/17/2022      Physician Orders: PT Eval and Treat   Medical Diagnosis from Referral: M54.16 (ICD-10-CM) - Lumbar radiculopathy  Evaluation Date: 2/7/2022  Authorization Period Expiration: 12/31/2022  Plan of Care Expiration: 5/7/2022  Progress Note Due: 3/7/2022  Visit # / Visits authorized: 1/ 52   FOTO: 0/5    Precautions: Standard       PTA Visit #: 0/5     Time In: 516pm  Time Out: 605pm  Total Billable Time: 49 minutes    SUBJECTIVE     Pt reports: she feels about the same today. She has been doing about 3-4 of the exercises from last time consistently.  She was compliant with home exercise program.  Response to previous treatment: No adverse effects   Functional change: None    Pain: 4/10  Location: left lower legs     OBJECTIVE     Objective Measures updated at progress report unless specified.     Treatment     Marivel received the treatments listed below:      therapeutic exercises to develop strength, endurance, ROM, flexibility, posture and core stabilization for 30 minutes including:    Upright bike 6 min  -Ball Rolls fwd/diag 10x NP  -Piriformis stretch 30 sec x 3 NP  -Calf stretching 30 sec x 3 NP  DKTC without swiss [id number]  -Nerve Gliding 2x10  NP  -QL Door Stretch 10sec x 10 NP  TrA activation 2x10, 5s NP  Quadruped shoulder taps x15 B with TrA activation  SL clams 2x30s BLE  Supine hip flexor stretch 5x15s B - modified to 1/2 kneel for HEP  Prone quad stretch 5x15s        Marivel received the following manual therapy techniques for 19 minutes:   PT used semi-standardized FDN protocol to B lumbar paraspinals with (6) 60 mm size needles, with mechanical winding  technique. Needled in situ 15 min.   and E-stim used (continuous symmetrical biphasic waveform at Frequency of 3Hz).        Palpation Assessment to determine the necessity for Functional Dry Needling    Patient provided written and verbal consent to Functional Dry Needling       Written Handout on response to FDN provided: yes    Marivel demonstrated good  understanding of the education provided.   Patient verbalized consent to FDN: yes      Patient Education and Home Exercises     Home Exercises Provided and Patient Education Provided     Education provided:   - Cont HEP    Written Home Exercises Provided: yes. Exercises were reviewed and Marivel was able to demonstrate them prior to the end of the session.  Marivel demonstrated good  understanding of the education provided. See EMR under Patient Instructions for exercises provided during therapy sessions    ASSESSMENT     Pt tolerated all progressions well today. Pt with no radicular symptoms at this date so emphasized manual therapy and tissue extensibility with core stabilization. No adverse effects to treatment and HEP updated.     Marivel Is progressing well towards her goals.   Pt prognosis is Fair.     Pt will continue to benefit from skilled outpatient physical therapy to address the deficits listed in the problem list box on initial evaluation, provide pt/family education and to maximize pt's level of independence in the home and community environment.     Pt's spiritual, cultural and educational needs considered and pt agreeable to plan of care and goals.     Anticipated barriers to physical therapy: chronicity, pain       Goals:   Short Term Goals (6 Weeks):  1. Pt will be compliant with HEP to supplement PT in decreasing pain with functional mobility.  2. Pt will perform pallof press with good control to demonstrate improved core strength.  3. Pt will improve impaired lumbar ROM by 10 degrees to improve functional mobility.  4. Pt will improve  impaired LE MMTs by 1/2 score to improve strength for functional tasks.  Long Term Goals (12 Weeks):   1. Pt will improve FOTO score to </= 37% limited to decrease perceived limitation with maintaining/changing body position.   2. Pt will perform floor to waist lift with good control to demonstrate improved core strength.  3. Pt will improve impaired LE MMTs by 1 score to improve strength for functional tasks.  4. Pt will report no pain during lumbar ROM to promote functional mobility.   5. Pt will be able to ambulate x6 minutes with pain rating of no more than 3/10 for improved functional mobility and QOL.         PLAN   Plan of care Certification: 2/7/2022 to 5/7/2022.     Outpatient Physical Therapy 2 times weekly for 10 weeks to include the following interventions: Cervical/Lumbar Traction, Electrical Stimulation  , Gait Training, Manual Therapy, Moist Heat/ Ice, Neuromuscular Re-ed, Patient Education, Self Care, Therapeutic Activities, Therapeutic Exercise, Ultrasound and kinesiology, IASTM, and dry needling.        Jesus Sparks, PT

## 2022-02-22 ENCOUNTER — CLINICAL SUPPORT (OUTPATIENT)
Dept: REHABILITATION | Facility: HOSPITAL | Age: 32
End: 2022-02-22
Payer: COMMERCIAL

## 2022-02-22 DIAGNOSIS — M62.81 WEAKNESS OF TRUNK MUSCULATURE: Primary | ICD-10-CM

## 2022-02-22 DIAGNOSIS — M53.86 DECREASED ROM OF LUMBAR SPINE: ICD-10-CM

## 2022-02-22 DIAGNOSIS — R29.3 POSTURE ABNORMALITY: ICD-10-CM

## 2022-02-22 PROCEDURE — 97110 THERAPEUTIC EXERCISES: CPT

## 2022-02-22 NOTE — PROGRESS NOTES
[facility]   Physical Therapy Treatment Note     Name: Marivel Chang  [facility] Number: [id number]    Therapy Diagnosis:   Encounter Diagnoses   Name Primary?    Weakness of trunk musculature Yes    Decreased ROM of lumbar spine     Posture abnormality      Physician: Jaquelin Betts,*    Visit Date: 2/22/2022      Physician Orders: PT Eval and Treat   Medical Diagnosis from Referral: M54.16 (ICD-10-CM) - Lumbar radiculopathy  Evaluation Date: 2/7/2022  Authorization Period Expiration: 12/31/2022  Plan of Care Expiration: 5/7/2022  Progress Note Due: 3/7/2022  Visit # / Visits authorized: 3/ 52   FOTO: 3/5    Precautions: Standard       PTA Visit #: 0/5     Time In: 545pm  Time Out: 615pm  Total Billable Time: 30 minutes    SUBJECTIVE     Pt reports: she still feels about the same. She states that she feels like the pain is slightly better but not enough to go down a level on the pain rating.  She was compliant with home exercise program.  Response to previous treatment: No adverse effects   Functional change: None    Pain: 4/10  Location: left lower legs     OBJECTIVE     Objective Measures updated at progress report unless specified.     Treatment     Marivel received the treatments listed below:      therapeutic exercises to develop strength, endurance, ROM, flexibility, posture and core stabilization for 30 minutes including:    Upright bike 6 min  Ball Rolls fwd/diag 10x, 5s  -Piriformis stretch 30 sec x 3 NP  -Calf stretching 30 sec x 3 NP  DKTC 2x10  TrA activation 2x10, 5s NP  Quadruped alternating UE x15 B with TrA activation  SL clams 2x30s BLE  1/2  kneel hip flexor stretch 5x15s B  Prone quad stretch 5x15s NP  Shyam stretch BLE 3x30s    NOT PERFORMED:  -Nerve Gliding 2x10  NP  -QL Door Stretch 10sec x 10 NP      Marivel received the following manual therapy techniques for 0 minutes:   PT used semi-standardized FDN protocol to B lumbar paraspinals with (6) 60 mm  size needles, with mechanical winding technique. Needled in situ 15 min.   and E-stim used (continuous symmetrical biphasic waveform at Frequency of 3Hz).        Palpation Assessment to determine the necessity for Functional Dry Needling    Patient provided written and verbal consent to Functional Dry Needling       Patient Education and Home Exercises     Home Exercises Provided and Patient Education Provided     Education provided:   - Cont HEP    Written Home Exercises Provided: yes. Exercises were reviewed and Marivel was able to demonstrate them prior to the end of the session.  Marivel demonstrated good  understanding of the education provided. See EMR under Patient Instructions for exercises provided during therapy sessions    ASSESSMENT     Pt with good tolerance to all activities at this date. Modified exercises at this date to address improving hip mobility and core stability without increasing symptoms from too much activity as pt mentioned in subjective. She is more limited in julianna stretch on R than L but more limited in hip IR fall-ins on L than R. No adverse effects. Plan to include FDN at next session with discussion of FDN for hip flexors in the future as tolerated.      Marivel Is progressing well towards her goals.   Pt prognosis is Fair.     Pt will continue to benefit from skilled outpatient physical therapy to address the deficits listed in the problem list box on initial evaluation, provide pt/family education and to maximize pt's level of independence in the home and community environment.     Pt's spiritual, cultural and educational needs considered and pt agreeable to plan of care and goals.     Anticipated barriers to physical therapy: chronicity, pain       Goals:   Short Term Goals (6 Weeks):  1. Pt will be compliant with HEP to supplement PT in decreasing pain with functional mobility.  2. Pt will perform pallof press with good control to demonstrate improved core strength.  3. Pt  will improve impaired lumbar ROM by 10 degrees to improve functional mobility.  4. Pt will improve impaired LE MMTs by 1/2 score to improve strength for functional tasks.  Long Term Goals (12 Weeks):   1. Pt will improve FOTO score to </= 37% limited to decrease perceived limitation with maintaining/changing body position.   2. Pt will perform floor to waist lift with good control to demonstrate improved core strength.  3. Pt will improve impaired LE MMTs by 1 score to improve strength for functional tasks.  4. Pt will report no pain during lumbar ROM to promote functional mobility.   5. Pt will be able to ambulate x6 minutes with pain rating of no more than 3/10 for improved functional mobility and QOL.         PLAN   Plan of care Certification: 2/7/2022 to 5/7/2022.     Outpatient Physical Therapy 2 times weekly for 10 weeks to include the following interventions: Cervical/Lumbar Traction, Electrical Stimulation  , Gait Training, Manual Therapy, Moist Heat/ Ice, Neuromuscular Re-ed, Patient Education, Self Care, Therapeutic Activities, Therapeutic Exercise, Ultrasound and kinesiology, IASTM, and dry needling.        Jesus Sparks, PT

## 2022-02-25 ENCOUNTER — CLINICAL SUPPORT (OUTPATIENT)
Dept: REHABILITATION | Facility: HOSPITAL | Age: 32
End: 2022-02-25
Payer: COMMERCIAL

## 2022-02-25 DIAGNOSIS — M53.86 DECREASED ROM OF LUMBAR SPINE: ICD-10-CM

## 2022-02-25 DIAGNOSIS — M62.81 WEAKNESS OF TRUNK MUSCULATURE: Primary | ICD-10-CM

## 2022-02-25 DIAGNOSIS — R29.3 POSTURE ABNORMALITY: ICD-10-CM

## 2022-02-25 PROCEDURE — 97110 THERAPEUTIC EXERCISES: CPT

## 2022-02-25 PROCEDURE — 97140 MANUAL THERAPY 1/> REGIONS: CPT

## 2022-02-25 NOTE — PROGRESS NOTES
[facility]   Physical Therapy Treatment Note     Name: Marivel Chang  [facility] Number: [id number]    Therapy Diagnosis:   Encounter Diagnoses   Name Primary?    Weakness of trunk musculature Yes    Decreased ROM of lumbar spine     Posture abnormality      Physician: Jaquelin Betts,*    Visit Date: 2/25/2022    Physician Orders: PT Eval and Treat   Medical Diagnosis from Referral: M54.16 (ICD-10-CM) - Lumbar radiculopathy  Evaluation Date: 2/7/2022  Authorization Period Expiration: 12/31/2022  Plan of Care Expiration: 5/7/2022  Progress Note Due: 3/7/2022  Visit # / Visits authorized: 4/ 52   FOTO: 4/5    Precautions: Standard       PTA Visit #: 0/5     Time In: 357pm  Time Out: 448pm  Total Billable Time: 51 minutes    SUBJECTIVE     Pt reports: she feels like PT is starting to work because she's noticed that she can tolerate more activity before her back starts to hurt with standing and walking.  She was compliant with home exercise program.  Response to previous treatment: No adverse effects   Functional change: None    Pain: 4/10  Location: left lower legs     OBJECTIVE     Objective Measures updated at progress report unless specified.     Treatment     Marivel received the treatments listed below:      therapeutic exercises to develop strength, endurance, ROM, flexibility, posture and core stabilization for 25 minutes including:    Upright bike 6 min  Ball Rolls fwd/diag 10x, 5s  -Piriformis stretch 30 sec x 3 NP  -Calf stretching 30 sec x 3 NP  DKTC 2x10, 3-5s hold  TrA activation 2x10, 5s NP  Quadruped alternating UE x10 B with TrA activation  Quadruped alternating LE x10 B with TrA activation  SL clams 2x30s BLE  1/2  kneel hip flexor stretch 5x15s B NP  Prone quad stretch 5x15s NP  Shyam stretch BLE 3x30s  Hip IR in hooklying x15 BLE    NOT PERFORMED:  -Nerve Gliding 2x10  NP  -QL Door Stretch 10sec x 10 NP      Marivel received the following manual therapy  techniques for 20 minutes:   PT used semi-standardized FDN protocol to B lumbar paraspinals with (6) 60 mm size needles, with mechanical winding technique. Needled in situ 15 min.   and E-stim used (continuous symmetrical biphasic waveform at Frequency of 3Hz).        Palpation Assessment to determine the necessity for Functional Dry Needling    Patient provided written and verbal consent to Functional Dry Needling       Patient Education and Home Exercises     Home Exercises Provided and Patient Education Provided     Education provided:   - Cont HEP    Written Home Exercises Provided: yes. Exercises were reviewed and Marivel was able to demonstrate them prior to the end of the session.  Marivel demonstrated good  understanding of the education provided. See EMR under Patient Instructions for exercises provided during therapy sessions    ASSESSMENT     Pt again tolerated therapy session well with no adverse effects. Good response to manual therapy with exercise program emphasizing hip mobility and core strength. She continues to be more limited by tissue extensibility at R hip flexors and with IR of L hip during activities. Pt would benefit from FDN to hip flexors at next date of service pending pt's presentation at that time.     Marivel Is progressing well towards her goals.   Pt prognosis is Fair.     Pt will continue to benefit from skilled outpatient physical therapy to address the deficits listed in the problem list box on initial evaluation, provide pt/family education and to maximize pt's level of independence in the home and community environment.     Pt's spiritual, cultural and educational needs considered and pt agreeable to plan of care and goals.     Anticipated barriers to physical therapy: chronicity, pain       Goals:   Short Term Goals (6 Weeks):  1. Pt will be compliant with HEP to supplement PT in decreasing pain with functional mobility.  2. Pt will perform pallof press with good control to  demonstrate improved core strength.  3. Pt will improve impaired lumbar ROM by 10 degrees to improve functional mobility.  4. Pt will improve impaired LE MMTs by 1/2 score to improve strength for functional tasks.  Long Term Goals (12 Weeks):   1. Pt will improve FOTO score to </= 37% limited to decrease perceived limitation with maintaining/changing body position.   2. Pt will perform floor to waist lift with good control to demonstrate improved core strength.  3. Pt will improve impaired LE MMTs by 1 score to improve strength for functional tasks.  4. Pt will report no pain during lumbar ROM to promote functional mobility.   5. Pt will be able to ambulate x6 minutes with pain rating of no more than 3/10 for improved functional mobility and QOL.         PLAN   Plan of care Certification: 2/7/2022 to 5/7/2022.     Outpatient Physical Therapy 2 times weekly for 10 weeks to include the following interventions: Cervical/Lumbar Traction, Electrical Stimulation  , Gait Training, Manual Therapy, Moist Heat/ Ice, Neuromuscular Re-ed, Patient Education, Self Care, Therapeutic Activities, Therapeutic Exercise, Ultrasound and kinesiology, IASTM, and dry needling.        Jesus Sparks, PT

## 2022-03-02 ENCOUNTER — CLINICAL SUPPORT (OUTPATIENT)
Dept: REHABILITATION | Facility: HOSPITAL | Age: 32
End: 2022-03-02
Payer: COMMERCIAL

## 2022-03-02 DIAGNOSIS — M53.86 DECREASED ROM OF LUMBAR SPINE: ICD-10-CM

## 2022-03-02 DIAGNOSIS — R29.3 POSTURE ABNORMALITY: ICD-10-CM

## 2022-03-02 DIAGNOSIS — M62.81 WEAKNESS OF TRUNK MUSCULATURE: Primary | ICD-10-CM

## 2022-03-02 PROCEDURE — 97140 MANUAL THERAPY 1/> REGIONS: CPT

## 2022-03-02 PROCEDURE — 97110 THERAPEUTIC EXERCISES: CPT

## 2022-03-02 NOTE — PROGRESS NOTES
[facility]   Physical Therapy Treatment Note     Name: Marivel Chang  [facility] Number: [id number]    Therapy Diagnosis:   Encounter Diagnoses   Name Primary?    Weakness of trunk musculature Yes    Decreased ROM of lumbar spine     Posture abnormality      Physician: Jaquelin Betts,*    Visit Date: 3/2/2022    Physician Orders: PT Eval and Treat   Medical Diagnosis from Referral: M54.16 (ICD-10-CM) - Lumbar radiculopathy  Evaluation Date: 2/7/2022  Authorization Period Expiration: 12/31/2022  Plan of Care Expiration: 5/7/2022  Progress Note Due: 3/7/2022  Visit # / Visits authorized: 5/ 52   FOTO: 5/5    Precautions: Standard        PTA Visit #: 0/5     Time In: 406pm (pt arrived late)  Time Out: 448pm  Total Billable Time: 42 minutes    SUBJECTIVE     Pt reports: she feels better today than she has been. Pain intensity is less and she can tolerate more activity before it's hurting.  She was compliant with home exercise program.  Response to previous treatment: No adverse effects   Functional change: None    Pain: 3/10  Location: left lower legs     OBJECTIVE     Objective Measures updated at progress report unless specified.     Treatment     Marivel received the treatments listed below:      therapeutic exercises to develop strength, endurance, ROM, flexibility, posture and core stabilization for 32 minutes including:    Upright bike 6 min  Ball Rolls fwd/diag 10x, 5s  DKTC 2x10, 3-5s hold  TrA activation 2x10, 5s NP  Quadruped alternating UE x10 B with TrA activation  Quadruped alternating LE x10 B with TrA activation  SL clams 2x30s BLE  Shyam stretch BLE 3x30s  Hip IR in hooklying x15 BLE  Shld ext with marching green theraband 2x10    NOT PERFORMED:  -Nerve Gliding 2x10  NP  -QL Door Stretch 10sec x 10 NP  -Piriformis stretch 30 sec x 3 NP  -Calf stretching 30 sec x 3 NP  1/2  kneel hip flexor stretch 5x15s B NP  Prone quad stretch 5x15s NP      Marivel received the  following manual therapy techniques for 10 minutes:   PT used semi-standardized FDN protocol to B lumbar paraspinals with (6) 60 mm size needles, with mechanical winding technique. Needled in situ 15 min.   and E-stim used (continuous symmetrical biphasic waveform at Frequency of 3Hz).    Not performed    PT used semi-standardized FDN protocol to R proximal rectus femoris with (1) 60 mm size needles, with mechanical winding technique. Needled in situ 5 min.     Palpation Assessment to determine the necessity for Functional Dry Needling    Patient provided written and verbal consent to Functional Dry Needling       Patient Education and Home Exercises     Home Exercises Provided and Patient Education Provided     Education provided:   - Cont HEP    Written Home Exercises Provided: yes. Exercises were reviewed and Marivel was able to demonstrate them prior to the end of the session.  Marivel demonstrated good  understanding of the education provided. See EMR under Patient Instructions for exercises provided during therapy sessions    ASSESSMENT     Pt presents with improvements in functional mobility and activity tolerance per subjective reports. Added FDN with minimal intensity at this date to address dec'd tissue extensibility of hip flexors. She tolerated all exercises well with no adverse effects.    Marivel Is progressing well towards her goals.   Pt prognosis is Fair.     Pt will continue to benefit from skilled outpatient physical therapy to address the deficits listed in the problem list box on initial evaluation, provide pt/family education and to maximize pt's level of independence in the home and community environment.     Pt's spiritual, cultural and educational needs considered and pt agreeable to plan of care and goals.     Anticipated barriers to physical therapy: chronicity, pain       Goals:   Short Term Goals (6 Weeks):  1. Pt will be compliant with HEP to supplement PT in decreasing pain with  functional mobility.  2. Pt will perform pallof press with good control to demonstrate improved core strength.  3. Pt will improve impaired lumbar ROM by 10 degrees to improve functional mobility.  4. Pt will improve impaired LE MMTs by 1/2 score to improve strength for functional tasks.  Long Term Goals (12 Weeks):   1. Pt will improve FOTO score to </= 37% limited to decrease perceived limitation with maintaining/changing body position.   2. Pt will perform floor to waist lift with good control to demonstrate improved core strength.  3. Pt will improve impaired LE MMTs by 1 score to improve strength for functional tasks.  4. Pt will report no pain during lumbar ROM to promote functional mobility.   5. Pt will be able to ambulate x6 minutes with pain rating of no more than 3/10 for improved functional mobility and QOL.         PLAN   Plan of care Certification: 2/7/2022 to 5/7/2022.     Outpatient Physical Therapy 2 times weekly for 10 weeks to include the following interventions: Cervical/Lumbar Traction, Electrical Stimulation  , Gait Training, Manual Therapy, Moist Heat/ Ice, Neuromuscular Re-ed, Patient Education, Self Care, Therapeutic Activities, Therapeutic Exercise, Ultrasound and kinesiology, IASTM, and dry needling.        Jesus Sparks, PT

## 2022-03-04 ENCOUNTER — CLINICAL SUPPORT (OUTPATIENT)
Dept: REHABILITATION | Facility: HOSPITAL | Age: 32
End: 2022-03-04
Payer: COMMERCIAL

## 2022-03-04 DIAGNOSIS — R29.3 POSTURE ABNORMALITY: ICD-10-CM

## 2022-03-04 DIAGNOSIS — M53.86 DECREASED ROM OF LUMBAR SPINE: ICD-10-CM

## 2022-03-04 DIAGNOSIS — M62.81 WEAKNESS OF TRUNK MUSCULATURE: Primary | ICD-10-CM

## 2022-03-04 PROCEDURE — 97110 THERAPEUTIC EXERCISES: CPT

## 2022-03-04 PROCEDURE — 97140 MANUAL THERAPY 1/> REGIONS: CPT

## 2022-03-04 NOTE — PROGRESS NOTES
[facility]   Physical Therapy Treatment Note     Name: Marivel Chang  Clinic Number: [id number]    Therapy Diagnosis:   Encounter Diagnoses   Name Primary?    Weakness of trunk musculature Yes    Decreased ROM of lumbar spine     Posture abnormality      Physician: Jaquelin Betts,*    Visit Date: 3/4/2022    Physician Orders: PT Eval and Treat   Medical Diagnosis from Referral: M54.16 (ICD-10-CM) - Lumbar radiculopathy  Evaluation Date: 2/7/2022  Authorization Period Expiration: 12/31/2022  Plan of Care Expiration: 5/7/2022  Progress Note Due: 3/7/2022  Visit # / Visits authorized: 6/ 52   FOTO: 5/5    Precautions: Standard        PTA Visit #: 0/5     Time In: 3:55pm   Time Out: 440pm  Total Billable Time: 45 minutes    SUBJECTIVE     Pt reports: she continues to feel like she's improving. She says the tingling in her L leg has improved in intensity.  She was compliant with home exercise program.  Response to previous treatment: No adverse effects   Functional change: None    Pain: 3/10  Location: left lower legs     OBJECTIVE     Objective Measures updated at progress report unless specified.     Treatment     Marivel received the treatments listed below:      therapeutic exercises to develop strength, endurance, ROM, flexibility, posture and core stabilization for 25 minutes including:    Upright bike 6 min  Ball Rolls fwd/diag 10x, 5s  DKTC 2x10, 3-5s hold  Quadruped alternating UE x10 B with TrA activation (progress next time)  Quadruped alternating LE x10 B with TrA activation (progress next time)  SL clams 2x30s BLE  Shyam stretch BLE 3x30s  Hip IR in hooklying x15 BLE  Shld ext with marching green theraband 2x10  1/2 kneel hip flexor stretch 3x30s    Next session:  pallof press    NOT PERFORMED:  -Nerve Gliding 2x10  NP  -QL Door Stretch 10sec x 10 NP  -Piriformis stretch 30 sec x 3 NP  -Calf stretching 30 sec x 3 NP  1/2  kneel hip flexor stretch 5x15s B NP  Prone  quad stretch 5x15s NP  TrA activation 2x10, 5s NP      Marivel received the following manual therapy techniques for 20 minutes:   PT used semi-standardized FDN protocol to B lumbar paraspinals with (6) 60 mm size needles, with mechanical winding technique. Needled in situ 15 min.   and E-stim used (continuous symmetrical biphasic waveform at Frequency of 3Hz).     PT used semi-standardized FDN protocol to R proximal rectus femoris with (1) 60 mm size needles, with mechanical winding technique. Needled in situ 5 min.    Not performed     Palpation Assessment to determine the necessity for Functional Dry Needling    Patient provided written and verbal consent to Functional Dry Needling       Patient Education and Home Exercises     Home Exercises Provided and Patient Education Provided     Education provided:   - Cont HEP    Written Home Exercises Provided: yes. Exercises were reviewed and Marivel was able to demonstrate them prior to the end of the session.  Marivel demonstrated good  understanding of the education provided. See EMR under Patient Instructions for exercises provided during therapy sessions    ASSESSMENT     Pt tolerated therapy session well today with no adverse effects. She required minimal VCs for TrA activation during quadruped activities. Continue to emphasize hip flexor tissue extensibility and core stability. Plan to progress as tolerated next session.     Marivel Is progressing well towards her goals.   Pt prognosis is Fair.     Pt will continue to benefit from skilled outpatient physical therapy to address the deficits listed in the problem list box on initial evaluation, provide pt/family education and to maximize pt's level of independence in the home and community environment.     Pt's spiritual, cultural and educational needs considered and pt agreeable to plan of care and goals.     Anticipated barriers to physical therapy: chronicity, pain       Goals:   Short Term Goals (6  Weeks):  1. Pt will be compliant with HEP to supplement PT in decreasing pain with functional mobility.  2. Pt will perform pallof press with good control to demonstrate improved core strength.  3. Pt will improve impaired lumbar ROM by 10 degrees to improve functional mobility.  4. Pt will improve impaired LE MMTs by 1/2 score to improve strength for functional tasks.  Long Term Goals (12 Weeks):   1. Pt will improve FOTO score to </= 37% limited to decrease perceived limitation with maintaining/changing body position.   2. Pt will perform floor to waist lift with good control to demonstrate improved core strength.  3. Pt will improve impaired LE MMTs by 1 score to improve strength for functional tasks.  4. Pt will report no pain during lumbar ROM to promote functional mobility.   5. Pt will be able to ambulate x6 minutes with pain rating of no more than 3/10 for improved functional mobility and QOL.         PLAN   Plan of care Certification: 2/7/2022 to 5/7/2022.     Outpatient Physical Therapy 2 times weekly for 10 weeks to include the following interventions: Cervical/Lumbar Traction, Electrical Stimulation  , Gait Training, Manual Therapy, Moist Heat/ Ice, Neuromuscular Re-ed, Patient Education, Self Care, Therapeutic Activities, Therapeutic Exercise, Ultrasound and kinesiology, IASTM, and dry needling.        Jesus Sparks, PT

## 2022-03-09 ENCOUNTER — CLINICAL SUPPORT (OUTPATIENT)
Dept: REHABILITATION | Facility: HOSPITAL | Age: 32
End: 2022-03-09
Payer: COMMERCIAL

## 2022-03-09 DIAGNOSIS — M62.81 WEAKNESS OF TRUNK MUSCULATURE: Primary | ICD-10-CM

## 2022-03-09 DIAGNOSIS — R29.3 POSTURE ABNORMALITY: ICD-10-CM

## 2022-03-09 DIAGNOSIS — M53.86 DECREASED ROM OF LUMBAR SPINE: ICD-10-CM

## 2022-03-09 PROCEDURE — 97140 MANUAL THERAPY 1/> REGIONS: CPT

## 2022-03-09 PROCEDURE — 97110 THERAPEUTIC EXERCISES: CPT

## 2022-03-09 NOTE — PROGRESS NOTES
[facility]   Physical Therapy Treatment Note     Name: Marivel Chang  [facility] Number: [id number]    Therapy Diagnosis:   Encounter Diagnoses   Name Primary?    Weakness of trunk musculature Yes    Decreased ROM of lumbar spine     Posture abnormality      Physician: Jaquelin Betts,*    Visit Date: 3/9/2022    Physician Orders: PT Eval and Treat   Medical Diagnosis from Referral: M54.16 (ICD-10-CM) - Lumbar radiculopathy  Evaluation Date: 2022  Authorization Period Expiration: 2022  Plan of Care Expiration: 2022  Progress Note Due: 2022  Visit # / Visits authorized:    FOTO: 0/5    Precautions: Standard        PTA Visit #: 0/5     Time In: 3:58pm   Time Out: 4:54 pm  Total Billable Time: 56 minutes    SUBJECTIVE     Pt reports: she returned to work this week, following last week's time off, with slight increase in symptoms, though not back to where she was before. She states that she was able to go shopping at the [employer] mall for the first time since this started with no increase in pain level above normal. She states that the stinging pain in her leg is now less intense. Her right hip has also improved significantly, though it still bothers her in her day to day life.   She was compliant with home exercise program.  Response to previous treatment: No adverse effects   Functional change: None    Pain: 3/10  Location: left lower legs     OBJECTIVE     Objective Measures updated at progress report unless specified.     Lumbar AROM: Pain/Dysfunction with Movement:   Flexion: 55 degrees Pulling around R hip   Extension: 15 degrees    Right side bendin degrees  minimal symptom on R   Left side bendin degrees     Right rotation: ~30% limited     Left rotation: ~20% limited          Right Left Comment   Hip Flexion: 4+/5 4+/5     Hip ABD: 4/5 4/5     Hip ADD: 4/5 4/5     Hip Extension: NT NT Performs good bridge against gravity   Hip IR: 4/5 4/5  Pain  at lateral hip (R)   Hip ER: 4/5 4/5          Right Left Comment   DF: 4+/5 4+/5     PF: 4+/5 4+/5     Knee flexion: 4+/5 4+/5     Knee extension: 4+/5 4+/5           Posture: significant anterior pelvic tilt in standing, R shoulder elevated     Hip flexion: WNL - no symptom reproduction     Hip IR: no reproduction of symptoms. Symmetrical     FOTO: 53%      Treatment     Marivel received the treatments listed below:      therapeutic exercises and objective measures above to develop strength, endurance, ROM, flexibility, posture and core stabilization for 30 minutes including:    Upright bike 6 min  Ball Rolls fwd/diag 10x, 5s NP  DKTC 2x10, 3-5s hold NP  Quadruped alternating arm and leg with TrA activation x10 B  SL clams 2x30s BLE NP  Shyam stretch BLE 3x30s  Hip IR in hooklying x15 BLE NP  Shld ext with marching green theraband 2x10 NP  1/2 kneel hip flexor stretch 3x30s NP  Pallof press DBL green theraband x10 B    Possible progressions:  Chops/lifts    NOT PERFORMED:  -Nerve Gliding 2x10  NP  -QL Door Stretch 10sec x 10 NP  -Piriformis stretch 30 sec x 3 NP  -Calf stretching 30 sec x 3 NP  1/2  kneel hip flexor stretch 5x15s B NP  Prone quad stretch 5x15s NP  TrA activation 2x10, 5s NP      Marivel received the following manual therapy techniques for 26 minutes:   Contract-relax hip flexor stretch (10s/20s)  PT used semi-standardized FDN protocol to B lumbar paraspinals with (6) 60 mm size needles, with mechanical winding technique. Needled in situ 15 min.   and E-stim used (continuous symmetrical biphasic waveform at Frequency of 3Hz).     PT used semi-standardized FDN protocol to R proximal rectus femoris with (1) 60 mm size needles, with mechanical winding technique. Needled in situ 5 min.    Not performed     Palpation Assessment to determine the necessity for Functional Dry Needling    Patient provided written and verbal consent to Functional Dry Needling         Patient Education and Home Exercises      Home Exercises Provided and Patient Education Provided     Education provided:   - Cont HEP    Written Home Exercises Provided: yes. Exercises were reviewed and Marivel was able to demonstrate them prior to the end of the session.  Marivel demonstrated good  understanding of the education provided. See EMR under Patient Instructions for exercises provided during therapy sessions    ASSESSMENT     Pt presents for 30-day update with significant improvements in subjective reports and objective measures as noted above. Pt with little change in lumbar ROM, but significant improvement in strength and hip ROM. Pt continues to tolerate interventions well today with good tolerance to all progressions. Good response in hip flexor tissue extensibility following contract relax stretch.      Marivel Is progressing well towards her goals.   Pt prognosis is Fair.     Pt will continue to benefit from skilled outpatient physical therapy to address the deficits listed in the problem list box on initial evaluation, provide pt/family education and to maximize pt's level of independence in the home and community environment.     Pt's spiritual, cultural and educational needs considered and pt agreeable to plan of care and goals.     Anticipated barriers to physical therapy: chronicity, pain       Goals:   Short Term Goals (6 Weeks):  1. Pt will be compliant with HEP to supplement PT in decreasing pain with functional mobility. - MET  2. Pt will perform pallof press with good control to demonstrate improved core strength. - MET  3. Pt will improve impaired lumbar ROM by 10 degrees to improve functional mobility. - PROGRESSING, NOT MET  4. Pt will improve impaired LE MMTs by 1/2 score to improve strength for functional tasks. - MET  Long Term Goals (12 Weeks):   1. Pt will improve FOTO score to </= 35% limited to decrease perceived limitation with maintaining/changing body position. - PROGRESSING, NOT MET  2. Pt will perform floor  to waist lift with good control to demonstrate improved core strength. - NOT TESTED  3. Pt will improve impaired LE MMTs by 1 score to improve strength for functional tasks. - PROGRESSING, NOT MET  4. Pt will report no pain during lumbar ROM to promote functional mobility.  - PROGRESSING, NOT MET  5. Pt will be able to ambulate x6 minutes with pain rating of no more than 3/10 for improved functional mobility and QOL. - PROGRESSING, NOT MET        PLAN   Plan of care Certification: 2/7/2022 to 5/7/2022.     Outpatient Physical Therapy 2 times weekly for 10 weeks to include the following interventions: Cervical/Lumbar Traction, Electrical Stimulation  , Gait Training, Manual Therapy, Moist Heat/ Ice, Neuromuscular Re-ed, Patient Education, Self Care, Therapeutic Activities, Therapeutic Exercise, Ultrasound and kinesiology, IASTM, and dry needling.        Jesus Sparks, PT

## 2022-03-16 ENCOUNTER — CLINICAL SUPPORT (OUTPATIENT)
Dept: REHABILITATION | Facility: HOSPITAL | Age: 32
End: 2022-03-16
Payer: COMMERCIAL

## 2022-03-16 DIAGNOSIS — R29.3 POSTURE ABNORMALITY: ICD-10-CM

## 2022-03-16 DIAGNOSIS — M53.86 DECREASED ROM OF LUMBAR SPINE: ICD-10-CM

## 2022-03-16 DIAGNOSIS — M62.81 WEAKNESS OF TRUNK MUSCULATURE: Primary | ICD-10-CM

## 2022-03-16 PROCEDURE — 97110 THERAPEUTIC EXERCISES: CPT

## 2022-03-16 PROCEDURE — 97140 MANUAL THERAPY 1/> REGIONS: CPT

## 2022-03-16 NOTE — PROGRESS NOTES
[facility]   Physical Therapy Treatment Note     Name: Marivel Chang  [facility] Number: [id number]    Therapy Diagnosis:   Encounter Diagnoses   Name Primary?    Weakness of trunk musculature Yes    Decreased ROM of lumbar spine     Posture abnormality      Physician: Jaquelin Betts,*    Visit Date: 3/16/2022    Physician Orders: PT Eval and Treat   Medical Diagnosis from Referral: M54.16 (ICD-10-CM) - Lumbar radiculopathy  Evaluation Date: 2/7/2022  Authorization Period Expiration: 12/31/2022  Plan of Care Expiration: 5/7/2022  Progress Note Due: 4/7/2022  Visit # / Visits authorized: 8/ 52   FOTO: 1/5    Precautions: Standard        PTA Visit #: 0/5     Time In: 3:57pm   Time Out: 4:48 pm  Total Billable Time: 50 minutes    SUBJECTIVE     Pt reports: she is feeling really good. She has been having really good days since Saturday. Standing is still the most bothersome, but she's doing much better and has continued to do her exercises at home.    She was compliant with home exercise program.  Response to previous treatment: No adverse effects   Functional change: None    Pain: 2/10  Location: left lower legs     OBJECTIVE     Objective Measures updated at progress report unless specified.       Treatment     Marivel received the treatments listed below:      therapeutic exercises and objective measures above to develop strength, endurance, ROM, flexibility, posture and core stabilization for 25 minutes including:    Upright bike 6 min  Ball Rolls fwd/diag 10x, 5s NP  DKTC 2x10, 3-5s hold NP  Quadruped alternating arm and leg with TrA activation x10 B  SL clams 2x30s BLE NP  Hip IR in hooklying x15 BLE NP  Shld ext with marching green theraband 2x10 NP  1/2 kneel hip flexor stretch 3x30s NP  Pallof press DBL green theraband x15 B  Chops green theraband 1/2 kneel x10 B    Possible progressions:  Chops/lifts    NOT PERFORMED:  -Nerve Gliding 2x10  NP  -QL Door Stretch 10sec x 10  NP  -Piriformis stretch 30 sec x 3 NP  -Calf stretching 30 sec x 3 NP  1/2  kneel hip flexor stretch 5x15s B NP  Prone quad stretch 5x15s NP  TrA activation 2x10, 5s NP      Marivel received the following manual therapy techniques for 25 minutes:   Contract-relax hip flexor stretch (10s/30s, x3 BLE)  PT used semi-standardized FDN protocol to B lumbar paraspinals with (6) 60 mm size needles, with mechanical winding technique. Needled in situ 15 min.   and E-stim used (continuous symmetrical biphasic waveform at Frequency of 3Hz).     PT used semi-standardized FDN protocol to R proximal rectus femoris with (1) 60 mm size needles, with mechanical winding technique. Needled in situ 5 min.    Not performed     Palpation Assessment to determine the necessity for Functional Dry Needling    Patient provided written and verbal consent to Functional Dry Needling         Patient Education and Home Exercises     Home Exercises Provided and Patient Education Provided     Education provided:   - Cont HEP    Written Home Exercises Provided: yes. Exercises were reviewed and Marivel was able to demonstrate them prior to the end of the session.  Marivel demonstrated good  understanding of the education provided. See EMR under Patient Instructions for exercises provided during therapy sessions    ASSESSMENT     Pt presents with continued significant improvements in subjective reports at this date as indicated above. She continues to respond well to contract-relax stretch with significant improvement in ROM attained after. Plan to modify frequency 1x/week for the next few weeks pending progression.      Marivel Is progressing well towards her goals.   Pt prognosis is Fair.     Pt will continue to benefit from skilled outpatient physical therapy to address the deficits listed in the problem list box on initial evaluation, provide pt/family education and to maximize pt's level of independence in the home and community environment.      Pt's spiritual, cultural and educational needs considered and pt agreeable to plan of care and goals.     Anticipated barriers to physical therapy: chronicity, pain       Goals:   Short Term Goals (6 Weeks):  1. Pt will be compliant with HEP to supplement PT in decreasing pain with functional mobility. - MET  2. Pt will perform pallof press with good control to demonstrate improved core strength. - MET  3. Pt will improve impaired lumbar ROM by 10 degrees to improve functional mobility. - PROGRESSING, NOT MET  4. Pt will improve impaired LE MMTs by 1/2 score to improve strength for functional tasks. - MET  Long Term Goals (12 Weeks):   1. Pt will improve FOTO score to </= 35% limited to decrease perceived limitation with maintaining/changing body position. - PROGRESSING, NOT MET  2. Pt will perform floor to waist lift with good control to demonstrate improved core strength. - NOT TESTED  3. Pt will improve impaired LE MMTs by 1 score to improve strength for functional tasks. - PROGRESSING, NOT MET  4. Pt will report no pain during lumbar ROM to promote functional mobility.  - PROGRESSING, NOT MET  5. Pt will be able to ambulate x6 minutes with pain rating of no more than 3/10 for improved functional mobility and QOL. - PROGRESSING, NOT MET        PLAN   Plan of care Certification: 2/7/2022 to 5/7/2022.     Outpatient Physical Therapy 2 times weekly for 10 weeks to include the following interventions: Cervical/Lumbar Traction, Electrical Stimulation  , Gait Training, Manual Therapy, Moist Heat/ Ice, Neuromuscular Re-ed, Patient Education, Self Care, Therapeutic Activities, Therapeutic Exercise, Ultrasound and kinesiology, IASTM, and dry needling.        Jesus Sparks, PT

## 2022-03-23 ENCOUNTER — CLINICAL SUPPORT (OUTPATIENT)
Dept: REHABILITATION | Facility: HOSPITAL | Age: 32
End: 2022-03-23
Payer: COMMERCIAL

## 2022-03-23 DIAGNOSIS — M62.81 WEAKNESS OF TRUNK MUSCULATURE: Primary | ICD-10-CM

## 2022-03-23 DIAGNOSIS — R29.3 POSTURE ABNORMALITY: ICD-10-CM

## 2022-03-23 DIAGNOSIS — M53.86 DECREASED ROM OF LUMBAR SPINE: ICD-10-CM

## 2022-03-23 PROCEDURE — 97140 MANUAL THERAPY 1/> REGIONS: CPT

## 2022-03-23 PROCEDURE — 97110 THERAPEUTIC EXERCISES: CPT

## 2022-03-23 PROCEDURE — 97116 GAIT TRAINING THERAPY: CPT

## 2022-03-23 NOTE — PROGRESS NOTES
[facility]   Physical Therapy Treatment Note     Name: Marivel Chang  Clinic Number: [id number]    Therapy Diagnosis:   Encounter Diagnoses   Name Primary?    Weakness of trunk musculature Yes    Decreased ROM of lumbar spine     Posture abnormality      Physician: Jaquelin Betts,*    Visit Date: 3/23/2022    Physician Orders: PT Eval and Treat   Medical Diagnosis from Referral: M54.16 (ICD-10-CM) - Lumbar radiculopathy  Evaluation Date: 2/7/2022  Authorization Period Expiration: 12/31/2022  Plan of Care Expiration: 5/7/2022  Progress Note Due: 4/7/2022  Visit # / Visits authorized: 9/ 52   FOTO: 2/5    Precautions: Standard        PTA Visit #: 0/5     Time In: 3:57pm   Time Out: 4:48 pm  Total Billable Time: 50 minutes    SUBJECTIVE     Pt reports: she has continued to feel much better.    She was compliant with home exercise program.  Response to previous treatment: No adverse effects   Functional change: None    Pain: 2/10  Location: left lower legs     OBJECTIVE     Objective Measures updated at progress report unless specified.       Treatment     Marivel received the treatments listed below:      therapeutic exercises and objective measures above to develop strength, endurance, ROM, flexibility, posture and core stabilization for 17 minutes including:    Upright bike 6 min NP  Ball Rolls fwd/diag 10x, 5s NP  DKTC 2x10, 3-5s hold NP  Quadruped on SB alternating arm and leg with TrA activation x10 B  SL clams 2x30s BLE NP  Hip IR in hooklying x15 BLE NP  Shld ext with marching green theraband 2x10 NP  1/2 kneel hip flexor stretch 3x30s NP  Pallof press DBL green theraband x15 B  Chops/and lifts green theraband 1/2 kneel x10 B    Possible progressions:  Hip extensions on shuttle    NOT PERFORMED:  -Nerve Gliding 2x10  NP  -QL Door Stretch 10sec x 10 NP  -Piriformis stretch 30 sec x 3 NP  -Calf stretching 30 sec x 3 NP  1/2  kneel hip flexor stretch 5x15s B NP  Prone quad  stretch 5x15s NP  TrA activation 2x10, 5s NP      Marivel received the following manual therapy techniques for 25 minutes:   Contract-relax hip flexor stretch (10s/30s, x3 BLE)  PT used semi-standardized FDN protocol to B lumbar paraspinals with (6) 60 mm size needles, with mechanical winding technique. Needled in situ 15 min.   and E-stim used (continuous symmetrical biphasic waveform at Frequency of 3Hz).     PT used semi-standardized FDN protocol to R proximal rectus femoris with (1) 60 mm size needles, with mechanical winding technique. Needled in situ 5 min.    Not performed     Palpation Assessment to determine the necessity for Functional Dry Needling    Patient provided written and verbal consent to Functional Dry Needling     gait training to improve functional mobility and safety for 8 minutes, including:  Ambulation with intermittent stair climbing x8'      Patient Education and Home Exercises     Home Exercises Provided and Patient Education Provided     Education provided:   - Cont HEP    Written Home Exercises Provided: yes. Exercises were reviewed and Marivel was able to demonstrate them prior to the end of the session.  Marivel demonstrated good  understanding of the education provided. See EMR under Patient Instructions for exercises provided during therapy sessions    ASSESSMENT     Pt continues to maintain improvements. Progressed activity today to include ambulation x8 minutes at beginning of session with very minor reproduction of pain that returned to baseline quickly after laying prone for next activity. Pt able to perform all exercises without seated rest break which is improvement from prior sessions. Modified quadruped exercises due to c/o wrist pain. Pt provided with green theraband to perform HEP.     Marivel Is progressing well towards her goals.   Pt prognosis is Fair.     Pt will continue to benefit from skilled outpatient physical therapy to address the deficits listed in the  problem list box on initial evaluation, provide pt/family education and to maximize pt's level of independence in the home and community environment.     Pt's spiritual, cultural and educational needs considered and pt agreeable to plan of care and goals.     Anticipated barriers to physical therapy: chronicity, pain       Goals:   Short Term Goals (6 Weeks):  1. Pt will be compliant with HEP to supplement PT in decreasing pain with functional mobility. - MET  2. Pt will perform pallof press with good control to demonstrate improved core strength. - MET  3. Pt will improve impaired lumbar ROM by 10 degrees to improve functional mobility. - PROGRESSING, NOT MET  4. Pt will improve impaired LE MMTs by 1/2 score to improve strength for functional tasks. - MET  Long Term Goals (12 Weeks):   1. Pt will improve FOTO score to </= 35% limited to decrease perceived limitation with maintaining/changing body position. - PROGRESSING, NOT MET  2. Pt will perform floor to waist lift with good control to demonstrate improved core strength. - NOT TESTED  3. Pt will improve impaired LE MMTs by 1 score to improve strength for functional tasks. - PROGRESSING, NOT MET  4. Pt will report no pain during lumbar ROM to promote functional mobility.  - PROGRESSING, NOT MET  5. Pt will be able to ambulate x6 minutes with pain rating of no more than 3/10 for improved functional mobility and QOL. - PROGRESSING, NOT MET        PLAN   Plan of care Certification: 2/7/2022 to 5/7/2022.     Outpatient Physical Therapy 2 times weekly for 10 weeks to include the following interventions: Cervical/Lumbar Traction, Electrical Stimulation  , Gait Training, Manual Therapy, Moist Heat/ Ice, Neuromuscular Re-ed, Patient Education, Self Care, Therapeutic Activities, Therapeutic Exercise, Ultrasound and kinesiology, IASTM, and dry needling.        Jesus Sparks, PT

## 2022-04-01 ENCOUNTER — CLINICAL SUPPORT (OUTPATIENT)
Dept: REHABILITATION | Facility: HOSPITAL | Age: 32
End: 2022-04-01
Payer: COMMERCIAL

## 2022-04-01 DIAGNOSIS — M53.86 DECREASED ROM OF LUMBAR SPINE: ICD-10-CM

## 2022-04-01 DIAGNOSIS — M62.81 WEAKNESS OF TRUNK MUSCULATURE: Primary | ICD-10-CM

## 2022-04-01 DIAGNOSIS — R29.3 POSTURE ABNORMALITY: ICD-10-CM

## 2022-04-01 PROCEDURE — 97110 THERAPEUTIC EXERCISES: CPT

## 2022-04-01 PROCEDURE — 97140 MANUAL THERAPY 1/> REGIONS: CPT

## 2022-04-01 NOTE — PROGRESS NOTES
[facility]   Physical Therapy Treatment Note     Name: Marivel Chang  [facility] Number: [id number]    Therapy Diagnosis:   Encounter Diagnoses   Name Primary?    Weakness of trunk musculature Yes    Decreased ROM of lumbar spine     Posture abnormality      Physician: Jaquelin Betts,*    Visit Date: 4/1/2022    Physician Orders: PT Eval and Treat   Medical Diagnosis from Referral: M54.16 (ICD-10-CM) - Lumbar radiculopathy  Evaluation Date: 2/7/2022  Authorization Period Expiration: 12/31/2022  Plan of Care Expiration: 5/7/2022  Progress Note Due: 4/7/2022  Visit # / Visits authorized: 10/ 52   FOTO: 3/5    Precautions: Standard        PTA Visit #: 0/5     Time In: 1:37pm   Time Out: 2:22 pm  Total Billable Time: 45 minutes    SUBJECTIVE     Pt reports: she had increased pain (5/10) over the weekend after spending almost twice the amount of time at work for a few days. She also got sick around the same time. During the time she had more work, she was unable to do her HEP. She started back with her HEP earlier in the week and as she became compliant again the pain returned to a 2/10. She requests modification in chops/lifts at home for ability to improve compliance. She states that her symptoms no longer go past her knee. Hurt back hurts more frequently now, but her pain stops about midway down her thigh now.   She was compliant with home exercise program.  Response to previous treatment: No adverse effects   Functional change: None    Pain: 2/10  Location: left lower legs     OBJECTIVE     Objective Measures updated at progress report unless specified.       Treatment     Marivel received the treatments listed below:      therapeutic exercises and objective measures above to develop strength, endurance, ROM, flexibility, posture and core stabilization for 20 minutes including:    Upright bike 6 min NP  Ball Rolls fwd/diag 10x, 5s NP  DKTC 2x10, 3-5s hold NP  Quadruped on SB  alternating arm and leg with TrA activation x10 B  SL clams 2x30s BLE NP  Hip IR in hooklying x15 BLE NP  Shld ext with marching green theraband 2x10 NP  1/2 kneel hip flexor stretch 3x30s NP  Pallof press DBL green theraband x15 B  Chops/and lifts green theraband 1/2 kneel x10 B   - lifts modified to standing diagonals to demonstrate possible modification for home performance.     Possible progressions:  Hip extensions on shuttle    NOT PERFORMED:  -Nerve Gliding 2x10  NP  -QL Door Stretch 10sec x 10 NP  -Piriformis stretch 30 sec x 3 NP  -Calf stretching 30 sec x 3 NP  1/2  kneel hip flexor stretch 5x15s B NP  Prone quad stretch 5x15s NP  TrA activation 2x10, 5s NP      Marivel received the following manual therapy techniques for 25 minutes:   Contract-relax hip flexor stretch (10s/30s, x3 BLE)  PT used semi-standardized FDN protocol to B lumbar paraspinals with (6) 60 mm size needles, with mechanical winding technique. Needled in situ 15 min.   and E-stim used (continuous symmetrical biphasic waveform at Frequency of 3Hz).     PT used semi-standardized FDN protocol to R proximal rectus femoris with (1) 60 mm size needles, with mechanical winding technique. Needled in situ 5 min.    Not performed     Palpation Assessment to determine the necessity for Functional Dry Needling    Patient provided written and verbal consent to Functional Dry Needling     gait training to improve functional mobility and safety for 0 minutes, including:  Ambulation with intermittent stair climbing x8'      Patient Education and Home Exercises     Home Exercises Provided and Patient Education Provided     Education provided:   - Cont HEP    Written Home Exercises Provided: yes. Exercises were reviewed and Marivel was able to demonstrate them prior to the end of the session.  Marivel demonstrated good  understanding of the education provided. See EMR under Patient Instructions for exercises provided during therapy  sessions    ASSESSMENT     Pt tolerated therapy session well today with no adverse effects. No progressions to exercises made at this date due to recent flare up as discussed above. Good performance of diagonals as modification to lifts to be performed with HEP as needed. Subjective reports indicate continued improvements with centralization of symptoms.     Marivel Is progressing well towards her goals.   Pt prognosis is Fair.     Pt will continue to benefit from skilled outpatient physical therapy to address the deficits listed in the problem list box on initial evaluation, provide pt/family education and to maximize pt's level of independence in the home and community environment.     Pt's spiritual, cultural and educational needs considered and pt agreeable to plan of care and goals.     Anticipated barriers to physical therapy: chronicity, pain       Goals:   Short Term Goals (6 Weeks):  1. Pt will be compliant with HEP to supplement PT in decreasing pain with functional mobility. - MET  2. Pt will perform pallof press with good control to demonstrate improved core strength. - MET  3. Pt will improve impaired lumbar ROM by 10 degrees to improve functional mobility. - PROGRESSING, NOT MET  4. Pt will improve impaired LE MMTs by 1/2 score to improve strength for functional tasks. - MET  Long Term Goals (12 Weeks):   1. Pt will improve FOTO score to </= 35% limited to decrease perceived limitation with maintaining/changing body position. - PROGRESSING, NOT MET  2. Pt will perform floor to waist lift with good control to demonstrate improved core strength. - NOT TESTED  3. Pt will improve impaired LE MMTs by 1 score to improve strength for functional tasks. - PROGRESSING, NOT MET  4. Pt will report no pain during lumbar ROM to promote functional mobility.  - PROGRESSING, NOT MET  5. Pt will be able to ambulate x6 minutes with pain rating of no more than 3/10 for improved functional mobility and QOL. - PROGRESSING,  NOT MET        PLAN   Plan of care Certification: 2/7/2022 to 5/7/2022.     Outpatient Physical Therapy 2 times weekly for 10 weeks to include the following interventions: Cervical/Lumbar Traction, Electrical Stimulation  , Gait Training, Manual Therapy, Moist Heat/ Ice, Neuromuscular Re-ed, Patient Education, Self Care, Therapeutic Activities, Therapeutic Exercise, Ultrasound and kinesiology, IASTM, and dry needling.        Jesus Sparks, PT

## 2022-04-06 ENCOUNTER — CLINICAL SUPPORT (OUTPATIENT)
Dept: REHABILITATION | Facility: HOSPITAL | Age: 32
End: 2022-04-06
Payer: COMMERCIAL

## 2022-04-06 DIAGNOSIS — R29.3 POSTURE ABNORMALITY: ICD-10-CM

## 2022-04-06 DIAGNOSIS — M53.86 DECREASED ROM OF LUMBAR SPINE: ICD-10-CM

## 2022-04-06 DIAGNOSIS — M62.81 WEAKNESS OF TRUNK MUSCULATURE: Primary | ICD-10-CM

## 2022-04-06 PROCEDURE — 97110 THERAPEUTIC EXERCISES: CPT

## 2022-04-06 PROCEDURE — 97140 MANUAL THERAPY 1/> REGIONS: CPT

## 2022-04-06 NOTE — PROGRESS NOTES
[facility] OUTPATIENT THERAPY AND WELLNESS   Physical Therapy Treatment Note     Name: Marivel Chang  [facility] Number: [id number]    Therapy Diagnosis:   Encounter Diagnoses   Name Primary?    Weakness of trunk musculature Yes    Decreased ROM of lumbar spine     Posture abnormality      Physician: Jaquelin Betts,*    Visit Date: 4/6/2022    Physician Orders: PT Eval and Treat   Medical Diagnosis from Referral: M54.16 (ICD-10-CM) - Lumbar radiculopathy  Evaluation Date: 2/7/2022  Authorization Period Expiration: 12/31/2022  Plan of Care Expiration: 5/7/2022  Progress Note Due: 4/7/2022  Visit # / Visits authorized: 11/ 52   FOTO: 4/5    Precautions: Standard        PTA Visit #: 0/5     Time In: 4:05pm   Time Out: 4:50 pm  Total Billable Time: 45 minutes    SUBJECTIVE     Pt reports: she feels like she is continuing to feel better. She is back to where she was after her flare up next week. She has noticed that walking will not bother her back now, but it is more the static standing that bothers her back.   She was compliant with home exercise program.  Response to previous treatment: No adverse effects   Functional change: None    Pain: 2/10  Location: left lower legs     OBJECTIVE     Objective Measures updated at progress report unless specified.       Lumbar AROM: Pain/Dysfunction with Movement:   Flexion: 55 degrees Pulling around R hip   Extension: 15 degrees     Right side bending: WNL    Left side bending: WNL     Right rotation: ~10% limited     Left rotation: ~15% limited            Posture: anterior pelvic tilt in standing, R shoulder elevated     Hip flexion: WNL - no symptom reproduction     Hip IR: no reproduction of symptoms. Symmetrical      FOTO: 37%      Treatment     Marivel received the treatments listed below:      therapeutic exercises and objective measures above to develop strength, endurance, ROM, flexibility, posture and core stabilization for 20 minutes including:    Upright bike 6  min NP  Ball Rolls fwd/diag 10x, 5s NP  DKTC 2x10, 3-5s hold NP  Quadruped on SB alternating arm and leg with TrA activation x10 B NP  SL clams 2x30s BLE NP  Hip IR in hooklying x15 BLE NP  Shld ext with marching green theraband 2x10 NP  1/2 kneel hip flexor stretch 3x30s NP  Pallof press DBL green theraband x15 B  Chops/and lifts green theraband 1/2 kneel x10 B   - lifts modified to standing diagonals to demonstrate possible modification for home performance.   Hip extensions on shuttle 0.5c x15 B  Supine TrA activations 2x10, 5s     NOT PERFORMED:  -Nerve Gliding 2x10  NP  -QL Door Stretch 10sec x 10 NP  -Piriformis stretch 30 sec x 3 NP  -Calf stretching 30 sec x 3 NP  1/2  kneel hip flexor stretch 5x15s B NP  Prone quad stretch 5x15s NP      Marivel received the following manual therapy techniques for 25 minutes:   Contract-relax hip flexor stretch (10s/30s, x3 BLE)  PT used semi-standardized FDN protocol to B lumbar paraspinals with (6) 60 mm size needles, with mechanical winding technique. Needled in situ 15 min.   and E-stim used (continuous symmetrical biphasic waveform at Frequency of 3Hz).     PT used semi-standardized FDN protocol to R proximal rectus femoris with (1) 60 mm size needles, with mechanical winding technique. Needled in situ 5 min.    Not performed     Palpation Assessment to determine the necessity for Functional Dry Needling    Patient provided written and verbal consent to Functional Dry Needling     gait training to improve functional mobility and safety for 0 minutes, including:  Ambulation with intermittent stair climbing x8'      Patient Education and Home Exercises     Home Exercises Provided and Patient Education Provided     Education provided:   - Cont HEP    Written Home Exercises Provided: yes. Exercises were reviewed and Marivel was able to demonstrate them prior to the end of the session.  Marivel demonstrated good  understanding of the education provided. See EMR under  Patient Instructions for exercises provided during therapy sessions    ASSESSMENT     Pt presents to clinic with significant improvements in subjective reports and objective measures as indicated above. She demonstrates improved resting posture with improved, though still present, anterior pelvic tilt in standing. She shows significant improvement in multisegmental rotational ability. She continues to benefit from skilled PT services.    Marivel Is progressing well towards her goals.   Pt prognosis is Fair.     Pt will continue to benefit from skilled outpatient physical therapy to address the deficits listed in the problem list box on initial evaluation, provide pt/family education and to maximize pt's level of independence in the home and community environment.     Pt's spiritual, cultural and educational needs considered and pt agreeable to plan of care and goals.     Anticipated barriers to physical therapy: chronicity, pain       Goals:   Short Term Goals (6 Weeks):  1. Pt will be compliant with HEP to supplement PT in decreasing pain with functional mobility. - MET  2. Pt will perform pallof press with good control to demonstrate improved core strength. - MET  3. Pt will improve impaired lumbar ROM by 10 degrees to improve functional mobility. - PROGRESSING, NOT MET  4. Pt will improve impaired LE MMTs by 1/2 score to improve strength for functional tasks. - MET  Long Term Goals (12 Weeks):   1. Pt will improve FOTO score to </= 35% limited to decrease perceived limitation with maintaining/changing body position. - PROGRESSING, NOT MET  2. Pt will perform floor to waist lift with good control to demonstrate improved core strength. - NOT TESTED  3. Pt will improve impaired LE MMTs by 1 score to improve strength for functional tasks. - PROGRESSING, NOT MET  4. Pt will report no pain during lumbar ROM to promote functional mobility.  - PROGRESSING, NOT MET  5. Pt will be able to ambulate x6 minutes with pain  rating of no more than 3/10 for improved functional mobility and QOL. - MET        PLAN   Plan of care Certification: 2/7/2022 to 5/7/2022.     Outpatient Physical Therapy 2 times weekly for 10 weeks to include the following interventions: Cervical/Lumbar Traction, Electrical Stimulation  , Gait Training, Manual Therapy, Moist Heat/ Ice, Neuromuscular Re-ed, Patient Education, Self Care, Therapeutic Activities, Therapeutic Exercise, Ultrasound and kinesiology, IASTM, and dry needling.        Jesus Sparks, PT

## 2022-04-14 ENCOUNTER — CLINICAL SUPPORT (OUTPATIENT)
Dept: REHABILITATION | Facility: HOSPITAL | Age: 32
End: 2022-04-14
Payer: COMMERCIAL

## 2022-04-14 DIAGNOSIS — M53.86 DECREASED ROM OF LUMBAR SPINE: ICD-10-CM

## 2022-04-14 DIAGNOSIS — R29.3 POSTURE ABNORMALITY: ICD-10-CM

## 2022-04-14 DIAGNOSIS — M62.81 WEAKNESS OF TRUNK MUSCULATURE: Primary | ICD-10-CM

## 2022-04-14 PROCEDURE — 97140 MANUAL THERAPY 1/> REGIONS: CPT

## 2022-04-14 PROCEDURE — 97110 THERAPEUTIC EXERCISES: CPT

## 2022-04-14 NOTE — PROGRESS NOTES
[facility]   Physical Therapy Treatment Note     Name: Marivel Chang  Clinic Number: [id number]    Therapy Diagnosis:   Encounter Diagnoses   Name Primary?    Weakness of trunk musculature Yes    Decreased ROM of lumbar spine     Posture abnormality      Physician: Jaquelin Betts,*    Visit Date: 4/14/2022    Physician Orders: PT Eval and Treat   Medical Diagnosis from Referral: M54.16 (ICD-10-CM) - Lumbar radiculopathy  Evaluation Date: 2/7/2022  Authorization Period Expiration: 12/31/2022  Plan of Care Expiration: 5/7/2022  Progress Note Due: 4/7/2022  Visit # / Visits authorized: 12/ 52   FOTO: 5/5    Precautions: Standard        PTA Visit #: 0/5     Time In: 3:50 pm   Time Out: 4:35 pm  Total Billable Time: 45 minutes    SUBJECTIVE     Pt reports: she has continued to feel better. She had to do static standing for a long period of time and she had no increase in her back pain while doing it. She has been consistent with her HEP.   She was compliant with home exercise program.  Response to previous treatment: No adverse effects   Functional change: None    Pain: 2/10  Location: left lower legs     OBJECTIVE     Objective Measures updated at progress report unless specified.     Treatment     Marivel received the treatments listed below:      therapeutic exercises and objective measures above to develop strength, endurance, ROM, flexibility, posture and core stabilization for 25 minutes including:  Quadruped on SB alternating arm and leg with TrA activation x10 B NP  Pallof press DBL green theraband x15 B  Chops/and lifts green theraband 1/2 kneel x10 B NP   - lifts modified to standing diagonals to demonstrate possible modification for home performance.   Hip extensions on shuttle 0.5c x15 B  Supine TrA activations 2x10, 5s NP    Marivel received the following manual therapy techniques for 20 minutes:   Contract-relax hip flexor stretch (10s/30s, x3 BLE)  PT used  semi-standardized FDN protocol to B lumbar paraspinals with (6) 60 mm size needles, with mechanical winding technique. Needled in situ 15 min.   and E-stim used (continuous symmetrical biphasic waveform at Frequency of 3Hz).     Palpation Assessment to determine the necessity for Functional Dry Needling    Patient provided written and verbal consent to Functional Dry Needling       Patient Education and Home Exercises     Home Exercises Provided and Patient Education Provided     Education provided:   - Cont HEP    Written Home Exercises Provided: yes. Exercises were reviewed and Marivel was able to demonstrate them prior to the end of the session.  Marivel demonstrated good  understanding of the education provided. See EMR under Patient Instructions for exercises provided during therapy sessions    ASSESSMENT     Pt continues to present with even further subjective improvements as indicated above. She responded well to all interventions at this date with no adverse effect. Provided HEP for continued maintenance of progress as discussed above and in patient instructions section. Pt provided with clinic contact information and instructed to contact with any questions or concerns.     Marivel Is progressing well towards her goals.   Pt prognosis is Fair.     Pt will continue to benefit from skilled outpatient physical therapy to address the deficits listed in the problem list box on initial evaluation, provide pt/family education and to maximize pt's level of independence in the home and community environment.     Pt's spiritual, cultural and educational needs considered and pt agreeable to plan of care and goals.     Anticipated barriers to physical therapy: chronicity, pain       Goals:   Short Term Goals (6 Weeks):  1. Pt will be compliant with HEP to supplement PT in decreasing pain with functional mobility. - MET  2. Pt will perform pallof press with good control to demonstrate improved core strength. -  MET  3. Pt will improve impaired lumbar ROM by 10 degrees to improve functional mobility. - PROGRESSING, NOT MET  4. Pt will improve impaired LE MMTs by 1/2 score to improve strength for functional tasks. - MET  Long Term Goals (12 Weeks):   1. Pt will improve FOTO score to </= 35% limited to decrease perceived limitation with maintaining/changing body position. - PROGRESSING, NOT MET  2. Pt will perform floor to waist lift with good control to demonstrate improved core strength. - NOT TESTED  3. Pt will improve impaired LE MMTs by 1 score to improve strength for functional tasks. - PROGRESSING, NOT MET  4. Pt will report no pain during lumbar ROM to promote functional mobility.  - PROGRESSING, NOT MET  5. Pt will be able to ambulate x6 minutes with pain rating of no more than 3/10 for improved functional mobility and QOL. - MET        PLAN   Plan of care Certification: 2/7/2022 to 5/7/2022.     Outpatient Physical Therapy 2 times weekly for 10 weeks to include the following interventions: Cervical/Lumbar Traction, Electrical Stimulation  , Gait Training, Manual Therapy, Moist Heat/ Ice, Neuromuscular Re-ed, Patient Education, Self Care, Therapeutic Activities, Therapeutic Exercise, Ultrasound and kinesiology, IASTM, and dry needling.        Jesus Sparks, PT

## 2022-04-30 ENCOUNTER — CLINICAL SUPPORT (OUTPATIENT)
Dept: OTHER | Facility: CLINIC | Age: 32
End: 2022-04-30
Payer: COMMERCIAL

## 2022-04-30 DIAGNOSIS — Z00.8 ENCOUNTER FOR OTHER GENERAL EXAMINATION: ICD-10-CM

## 2022-05-01 VITALS
DIASTOLIC BLOOD PRESSURE: 80 MMHG | WEIGHT: 185 LBS | SYSTOLIC BLOOD PRESSURE: 106 MMHG | BODY MASS INDEX: 34.04 KG/M2 | HEIGHT: 62 IN

## 2022-05-01 LAB
HDLC SERPL-MCNC: 46 MG/DL
POC CHOLESTEROL, LDL (DOCK): 117.58 MG/DL
POC CHOLESTEROL, TOTAL: 184 MG/DL
POC GLUCOSE, FASTING: 92 MG/DL (ref 60–110)
TRIGL SERPL-MCNC: 113 MG/DL

## 2023-04-29 ENCOUNTER — CLINICAL SUPPORT (OUTPATIENT)
Dept: OTHER | Facility: CLINIC | Age: 33
End: 2023-04-29

## 2023-04-29 DIAGNOSIS — Z00.8 ENCOUNTER FOR OTHER GENERAL EXAMINATION: ICD-10-CM

## 2023-04-30 VITALS
BODY MASS INDEX: 34.04 KG/M2 | HEIGHT: 62 IN | DIASTOLIC BLOOD PRESSURE: 74 MMHG | WEIGHT: 185 LBS | SYSTOLIC BLOOD PRESSURE: 110 MMHG

## 2023-04-30 LAB
HDLC SERPL-MCNC: 55 MG/DL
POC CHOLESTEROL, LDL (DOCK): 143 MG/DL
POC CHOLESTEROL, TOTAL: 224 MG/DL
POC GLUCOSE, FASTING: 90 MG/DL (ref 60–110)
TRIGL SERPL-MCNC: 145 MG/DL

## 2024-04-13 ENCOUNTER — CLINICAL SUPPORT (OUTPATIENT)
Dept: OTHER | Facility: CLINIC | Age: 34
End: 2024-04-13

## 2024-04-13 DIAGNOSIS — Z00.8 ENCOUNTER FOR OTHER GENERAL EXAMINATION: ICD-10-CM

## 2024-04-16 VITALS
DIASTOLIC BLOOD PRESSURE: 84 MMHG | WEIGHT: 181 LBS | SYSTOLIC BLOOD PRESSURE: 130 MMHG | HEIGHT: 61 IN | BODY MASS INDEX: 34.17 KG/M2

## 2024-04-16 LAB
HDLC SERPL-MCNC: 47 MG/DL
POC CHOLESTEROL, LDL (DOCK): 124 MG/DL
POC CHOLESTEROL, TOTAL: 195 MG/DL
POC GLUCOSE, FASTING: 97 MG/DL (ref 60–110)
TRIGL SERPL-MCNC: 135 MG/DL

## 2025-06-05 ENCOUNTER — PATIENT MESSAGE (OUTPATIENT)
Dept: BEHAVIORAL HEALTH | Facility: CLINIC | Age: 35
End: 2025-06-05
Payer: COMMERCIAL

## 2025-06-05 ENCOUNTER — RESULTS FOLLOW-UP (OUTPATIENT)
Dept: INTERNAL MEDICINE | Facility: CLINIC | Age: 35
End: 2025-06-05

## 2025-06-05 ENCOUNTER — OFFICE VISIT (OUTPATIENT)
Dept: INTERNAL MEDICINE | Facility: CLINIC | Age: 35
End: 2025-06-05
Payer: COMMERCIAL

## 2025-06-05 ENCOUNTER — LAB VISIT (OUTPATIENT)
Dept: LAB | Facility: HOSPITAL | Age: 35
End: 2025-06-05
Payer: COMMERCIAL

## 2025-06-05 ENCOUNTER — TELEPHONE (OUTPATIENT)
Dept: INTERNAL MEDICINE | Facility: CLINIC | Age: 35
End: 2025-06-05

## 2025-06-05 ENCOUNTER — TELEPHONE (OUTPATIENT)
Dept: BEHAVIORAL HEALTH | Facility: CLINIC | Age: 35
End: 2025-06-05
Payer: COMMERCIAL

## 2025-06-05 VITALS
DIASTOLIC BLOOD PRESSURE: 86 MMHG | HEIGHT: 61 IN | BODY MASS INDEX: 36.42 KG/M2 | WEIGHT: 192.88 LBS | OXYGEN SATURATION: 98 % | SYSTOLIC BLOOD PRESSURE: 118 MMHG

## 2025-06-05 DIAGNOSIS — R63.5 WEIGHT GAIN: ICD-10-CM

## 2025-06-05 DIAGNOSIS — D50.9 IRON DEFICIENCY ANEMIA, UNSPECIFIED IRON DEFICIENCY ANEMIA TYPE: Primary | ICD-10-CM

## 2025-06-05 DIAGNOSIS — E55.9 VITAMIN D DEFICIENCY: ICD-10-CM

## 2025-06-05 DIAGNOSIS — Z00.00 ANNUAL PHYSICAL EXAM: Primary | ICD-10-CM

## 2025-06-05 DIAGNOSIS — E78.9 ABNORMAL CHOLESTEROL TEST: ICD-10-CM

## 2025-06-05 DIAGNOSIS — Z00.00 ANNUAL PHYSICAL EXAM: ICD-10-CM

## 2025-06-05 DIAGNOSIS — F41.8 SITUATIONAL ANXIETY: ICD-10-CM

## 2025-06-05 LAB
25(OH)D3+25(OH)D2 SERPL-MCNC: 18 NG/ML (ref 30–96)
ABSOLUTE EOSINOPHIL (OHS): 0.21 K/UL
ABSOLUTE MONOCYTE (OHS): 0.47 K/UL (ref 0.3–1)
ABSOLUTE NEUTROPHIL COUNT (OHS): 3.73 K/UL (ref 1.8–7.7)
ALBUMIN SERPL BCP-MCNC: 4.2 G/DL (ref 3.5–5.2)
ALP SERPL-CCNC: 66 UNIT/L (ref 40–150)
ALT SERPL W/O P-5'-P-CCNC: 17 UNIT/L (ref 10–44)
ANION GAP (OHS): 9 MMOL/L (ref 8–16)
AST SERPL-CCNC: 19 UNIT/L (ref 11–45)
BASOPHILS # BLD AUTO: 0.07 K/UL
BASOPHILS NFR BLD AUTO: 1 %
BILIRUB SERPL-MCNC: 0.4 MG/DL (ref 0.1–1)
BUN SERPL-MCNC: 8 MG/DL (ref 6–20)
CALCIUM SERPL-MCNC: 9 MG/DL (ref 8.7–10.5)
CHLORIDE SERPL-SCNC: 104 MMOL/L (ref 95–110)
CHOLEST SERPL-MCNC: 209 MG/DL (ref 120–199)
CHOLEST/HDLC SERPL: 4.4 {RATIO} (ref 2–5)
CO2 SERPL-SCNC: 24 MMOL/L (ref 23–29)
CREAT SERPL-MCNC: 0.7 MG/DL (ref 0.5–1.4)
EAG (OHS): 108 MG/DL (ref 68–131)
ERYTHROCYTE [DISTWIDTH] IN BLOOD BY AUTOMATED COUNT: 14 % (ref 11.5–14.5)
FERRITIN SERPL-MCNC: 10 NG/ML (ref 20–300)
GFR SERPLBLD CREATININE-BSD FMLA CKD-EPI: >60 ML/MIN/1.73/M2
GLUCOSE SERPL-MCNC: 93 MG/DL (ref 70–110)
HBA1C MFR BLD: 5.4 % (ref 4–5.6)
HCT VFR BLD AUTO: 40.3 % (ref 37–48.5)
HDLC SERPL-MCNC: 48 MG/DL (ref 40–75)
HDLC SERPL: 23 % (ref 20–50)
HGB BLD-MCNC: 12.5 GM/DL (ref 12–16)
IMM GRANULOCYTES # BLD AUTO: 0.03 K/UL (ref 0–0.04)
IMM GRANULOCYTES NFR BLD AUTO: 0.4 % (ref 0–0.5)
IRON SATN MFR SERPL: 6 % (ref 20–50)
IRON SERPL-MCNC: 34 UG/DL (ref 30–160)
LDLC SERPL CALC-MCNC: 137.2 MG/DL (ref 63–159)
LYMPHOCYTES # BLD AUTO: 2.29 K/UL (ref 1–4.8)
MCH RBC QN AUTO: 24.7 PG (ref 27–31)
MCHC RBC AUTO-ENTMCNC: 31 G/DL (ref 32–36)
MCV RBC AUTO: 80 FL (ref 82–98)
NONHDLC SERPL-MCNC: 161 MG/DL
NUCLEATED RBC (/100WBC) (OHS): 0 /100 WBC
PLATELET # BLD AUTO: 322 K/UL (ref 150–450)
PMV BLD AUTO: 11.3 FL (ref 9.2–12.9)
POTASSIUM SERPL-SCNC: 4.4 MMOL/L (ref 3.5–5.1)
PROT SERPL-MCNC: 7.3 GM/DL (ref 6–8.4)
RBC # BLD AUTO: 5.07 M/UL (ref 4–5.4)
RELATIVE EOSINOPHIL (OHS): 3.1 %
RELATIVE LYMPHOCYTE (OHS): 33.7 % (ref 18–48)
RELATIVE MONOCYTE (OHS): 6.9 % (ref 4–15)
RELATIVE NEUTROPHIL (OHS): 54.9 % (ref 38–73)
SODIUM SERPL-SCNC: 137 MMOL/L (ref 136–145)
TIBC SERPL-MCNC: 551 UG/DL (ref 250–450)
TRANSFERRIN SERPL-MCNC: 372 MG/DL (ref 200–375)
TRIGL SERPL-MCNC: 119 MG/DL (ref 30–150)
TSH SERPL-ACNC: 1.89 UIU/ML (ref 0.4–4)
WBC # BLD AUTO: 6.8 K/UL (ref 3.9–12.7)

## 2025-06-05 PROCEDURE — 36415 COLL VENOUS BLD VENIPUNCTURE: CPT

## 2025-06-05 PROCEDURE — 82306 VITAMIN D 25 HYDROXY: CPT

## 2025-06-05 PROCEDURE — 85025 COMPLETE CBC W/AUTO DIFF WBC: CPT

## 2025-06-05 PROCEDURE — 80053 COMPREHEN METABOLIC PANEL: CPT

## 2025-06-05 PROCEDURE — 80061 LIPID PANEL: CPT

## 2025-06-05 PROCEDURE — 83036 HEMOGLOBIN GLYCOSYLATED A1C: CPT

## 2025-06-05 PROCEDURE — 99999 PR PBB SHADOW E&M-EST. PATIENT-LVL V: CPT | Mod: PBBFAC,,, | Performed by: NURSE PRACTITIONER

## 2025-06-05 PROCEDURE — 83540 ASSAY OF IRON: CPT

## 2025-06-05 PROCEDURE — 84443 ASSAY THYROID STIM HORMONE: CPT

## 2025-06-05 PROCEDURE — 82728 ASSAY OF FERRITIN: CPT

## 2025-06-05 RX ORDER — FERROUS SULFATE 325(65) MG
325 TABLET, DELAYED RELEASE (ENTERIC COATED) ORAL DAILY
Qty: 90 TABLET | Refills: 1 | Status: SHIPPED | OUTPATIENT
Start: 2025-06-05

## 2025-07-01 ENCOUNTER — TELEPHONE (OUTPATIENT)
Dept: BEHAVIORAL HEALTH | Facility: CLINIC | Age: 35
End: 2025-07-01
Payer: COMMERCIAL

## (undated) DEVICE — DRESSING LEUKOPLAST FLEX 1X3IN